# Patient Record
Sex: MALE | Race: WHITE | NOT HISPANIC OR LATINO | Employment: FULL TIME | URBAN - METROPOLITAN AREA
[De-identification: names, ages, dates, MRNs, and addresses within clinical notes are randomized per-mention and may not be internally consistent; named-entity substitution may affect disease eponyms.]

---

## 2018-04-20 ENCOUNTER — HOSPITAL ENCOUNTER (EMERGENCY)
Facility: HOSPITAL | Age: 28
Discharge: HOME | End: 2018-04-20
Attending: EMERGENCY MEDICINE
Payer: COMMERCIAL

## 2018-04-20 VITALS
RESPIRATION RATE: 18 BRPM | SYSTOLIC BLOOD PRESSURE: 135 MMHG | OXYGEN SATURATION: 98 % | TEMPERATURE: 97.7 F | WEIGHT: 205 LBS | DIASTOLIC BLOOD PRESSURE: 79 MMHG | HEART RATE: 68 BPM | BODY MASS INDEX: 32.18 KG/M2 | HEIGHT: 67 IN

## 2018-04-20 DIAGNOSIS — S05.01XA ABRASION OF RIGHT CORNEA, INITIAL ENCOUNTER: Primary | ICD-10-CM

## 2018-04-20 PROCEDURE — 99283 EMERGENCY DEPT VISIT LOW MDM: CPT

## 2018-04-20 RX ORDER — OFLOXACIN 3 MG/ML
1 SOLUTION/ DROPS OPHTHALMIC 4 TIMES DAILY
Qty: 5 ML | Refills: 0 | Status: SHIPPED | OUTPATIENT
Start: 2018-04-20 | End: 2018-04-25

## 2018-04-20 RX ORDER — PROPARACAINE HYDROCHLORIDE 5 MG/ML
1 SOLUTION/ DROPS OPHTHALMIC ONCE
Status: COMPLETED | OUTPATIENT
Start: 2018-04-20 | End: 2018-04-20

## 2018-04-20 RX ADMIN — PROPARACAINE HYDROCHLORIDE 1 DROP: 5 SOLUTION/ DROPS OPHTHALMIC at 08:44

## 2018-04-20 RX ADMIN — FLUORESCEIN SODIUM 1 STRIP: 1 STRIP OPHTHALMIC at 08:44

## 2018-04-20 NOTE — ED PROVIDER NOTES
History  Chief Complaint   Patient presents with    Eye Problem     poss dirt in right eye     56-year-old male patient presenting with a chief complaint of right eye irritation  He believes he got something in his eye last night while working underneath a car  The right eye is having conjunctival injection  There is some mild periorbital edema but nothing that would suggest a cellulitis  Prior to Admission Medications   Prescriptions Last Dose Informant Patient Reported? Taking?   ondansetron (ZOFRAN-ODT) 4 mg disintegrating tablet   No No   Sig: Take 1 tablet by mouth every 8 (eight) hours as needed for nausea or vomiting for up to 3 days      Facility-Administered Medications: None       History reviewed  No pertinent past medical history  History reviewed  No pertinent surgical history  History reviewed  No pertinent family history  I have reviewed and agree with the history as documented  Social History   Substance Use Topics    Smoking status: Never Smoker    Smokeless tobacco: Not on file    Alcohol use Yes      Comment: socially        Review of Systems   Constitutional: Negative for diaphoresis, fatigue and fever  HENT: Negative for congestion, ear pain, nosebleeds and sore throat  Eyes: Positive for redness  Negative for photophobia, pain, discharge and visual disturbance  Respiratory: Negative for cough, choking, chest tightness, shortness of breath and wheezing  Cardiovascular: Negative for chest pain and palpitations  Gastrointestinal: Negative for abdominal distention, abdominal pain, diarrhea and vomiting  Genitourinary: Negative for dysuria, flank pain and frequency  Musculoskeletal: Negative for back pain, gait problem and joint swelling  Skin: Negative for color change and rash  Neurological: Negative for dizziness, syncope and headaches  Psychiatric/Behavioral: Negative for behavioral problems and confusion  The patient is not nervous/anxious  All other systems reviewed and are negative  Physical Exam  ED Triage Vitals [04/20/18 0811]   Temperature Pulse Respirations Blood Pressure SpO2   97 7 °F (36 5 °C) 69 18 135/79 97 %      Temp src Heart Rate Source Patient Position - Orthostatic VS BP Location FiO2 (%)   -- -- -- -- --      Pain Score       No Pain           Orthostatic Vital Signs  Vitals:    04/20/18 0811 04/20/18 0815   BP: 135/79 135/79   Pulse: 69 68       Physical Exam   Constitutional: He is oriented to person, place, and time  He appears well-developed and well-nourished  HENT:   Head: Normocephalic and atraumatic  Eyes: Pupils are equal, round, and reactive to light  Right eye corneal abrasion with mild periorbital edema corneal abrasions lateral at approximately 9 o'clock   Neck: Normal range of motion  Neck supple  Cardiovascular: Normal rate, regular rhythm, normal heart sounds and normal pulses  PMI is not displaced  Pulmonary/Chest: Effort normal and breath sounds normal  No respiratory distress  Abdominal: Soft  He exhibits no distension  There is no guarding  Musculoskeletal: Normal range of motion  Lymphadenopathy:     He has no cervical adenopathy  Neurological: He is alert and oriented to person, place, and time  Skin: Skin is warm and dry  No rash noted  He is not diaphoretic  No pallor  Psychiatric: He has a normal mood and affect  Vitals reviewed        ED Medications  Medications   proparacaine (ALCAINE) 0 5 % ophthalmic solution 1 drop (not administered)   fluorescein sodium sterile ophthalmic strip 1 strip (not administered)       Diagnostic Studies  Results Reviewed     None                 No orders to display              Procedures  Procedures       Phone Contacts  ED Phone Contact    ED Course  ED Course                                MDM  Number of Diagnoses or Management Options  Abrasion of right cornea, initial encounter: new and requires workup  Diagnosis management comments: Mild corneal abrasion  Antibiotic therapy  Follow-up as needed  Amount and/or Complexity of Data Reviewed  Independent visualization of images, tracings, or specimens: yes    Patient Progress  Patient progress: stable    CritCare Time    Disposition  Final diagnoses:   Abrasion of right cornea, initial encounter     Time reflects when diagnosis was documented in both MDM as applicable and the Disposition within this note     Time User Action Codes Description Comment    4/20/2018  8:29 AM Alessandro Julio Add [S05 [id number]] Abrasion of right cornea, initial encounter       ED Disposition     ED Disposition Condition Comment    Discharge  Dk Betancur discharge to home/self care  Condition at discharge: Good        Follow-up Information     Follow up With Specialties Details Why Contact Info    Silas Swanson MD Family Medicine   [address] [place] [address]  [address] [place] [address] E  Schedule an appointment as soon as possible for a visit For Continued Evaluation [address]  [phone]        Patient's Medications   Discharge Prescriptions    OFLOXACIN (OCUFLOX) 0 3 % OPHTHALMIC SOLUTION    Apply 1 drop to eye 4 (four) times a day for 5 days       Start Date: 4/20/2018 End Date: 4/25/2018       Order Dose: 1 drop       Quantity: 5 mL    Refills: 0     No discharge procedures on file      ED Provider  Electronically Signed by           Margarita Montero  04/20/18 2025

## 2018-04-20 NOTE — DISCHARGE INSTRUCTIONS
Corneal Abrasion   WHAT YOU NEED TO KNOW:   A corneal abrasion is a scratch on the cornea of your eye  The cornea is the clear layer that covers the front of your eye  A small scratch may heal in 1 to 2 days  Deeper or larger scratches may take longer to heal         DISCHARGE INSTRUCTIONS:   Contact your healthcare provider if:   · Your eye pain or vision gets worse  · You have yellow or green drainage from your eye  · You have questions or concerns about your condition or care  Medicines:   · Medicines  may be given in the form of eyedrops or ointment to help prevent an eye infection  You may also be given eye drops to decrease pain  Ask how to take this medicine safely  · Take your medicine as directed  Contact your healthcare provider if you think your medicine is not helping or if you have side effects  Tell him or her if you are allergic to any medicine  Keep a list of the medicines, vitamins, and herbs you take  Include the amounts, and when and why you take them  Bring the list or the pill bottles to follow-up visits  Carry your medicine list with you in case of an emergency  Follow up with your healthcare provider as directed:  Write down your questions so you remember to ask them during your visits  Self-care:   · Do not touch or rub your eye  · Ask your healthcare provider when you can start your normal activities  · Ask your healthcare provider when you can wear your contact lenses  · Wear sunglasses in bright light until your eyes feel better  Help prevent corneal abrasions:   · Remove your contact lenses if your eyes feel dry or irritated  · Wash your hands if you need to touch your eyes or your face  · Trim your child's fingernails so he cannot scratch his eye  · Wear protective eyewear when you work with chemicals, wood, dust, or metal      · Wear protective eyewear when you play sports  · Do not wear your contacts for longer than you should       · Do not wear colored lenses or lenses with shapes on them  These lenses may cause eye damage and vision loss  · Do not wear glitter makeup  Glitter can easily get into your eyes and under contact lenses  · Do not sleep with your contacts in your eyes  © 2017 [address] Information is for End User's use only and may not be sold, redistributed or otherwise used for commercial purposes  All illustrations and images included in CareNotes® are the copyrighted property of [employer] , [email or web address]  or [employer] Jackson  The above information is an  only  It is not intended as medical advice for individual conditions or treatments  Talk to your doctor, nurse or pharmacist before following any medical regimen to see if it is safe and effective for you

## 2018-04-23 ENCOUNTER — VBI (OUTPATIENT)
Dept: ADMINISTRATIVE | Facility: OTHER | Age: 28
End: 2018-04-23

## 2018-04-23 NOTE — TELEPHONE ENCOUNTER
Miguellinnette Susis    ED Visit Information     Ed visit date: 4/20/18  Diagnosis Description: Abrasion of right cornea,   initial encounter  In Network? Yes Cindy  Discharge status: Home  Discharged with meds ? NA  Number of ED visits to date: 1  ED Severity:4     Outreach Information    Outreach successful: Yes 1  Date letter mailed:  Date Finalized:4/23/18    Care Coordination    Follow up appointment with pcp: no feeling better  Transportation issues ? NA    [employer] type:  3 Day Outreach  Patient refsued the answer questions:  Yes  [facility] PCP:  Yes  Liliam Hobson stated he is feeling better but unable to answer questions

## 2021-12-03 ENCOUNTER — OFFICE VISIT (OUTPATIENT)
Dept: INTERNAL MEDICINE CLINIC | Facility: CLINIC | Age: 31
End: 2021-12-03
Payer: COMMERCIAL

## 2021-12-03 VITALS
HEART RATE: 71 BPM | WEIGHT: 218.8 LBS | TEMPERATURE: 98.3 F | OXYGEN SATURATION: 98 % | HEIGHT: 68 IN | DIASTOLIC BLOOD PRESSURE: 80 MMHG | BODY MASS INDEX: 33.16 KG/M2 | SYSTOLIC BLOOD PRESSURE: 116 MMHG

## 2021-12-03 DIAGNOSIS — K21.00 GASTROESOPHAGEAL REFLUX DISEASE WITH ESOPHAGITIS WITHOUT HEMORRHAGE: ICD-10-CM

## 2021-12-03 DIAGNOSIS — Z11.59 ENCOUNTER FOR HEPATITIS C SCREENING TEST FOR LOW RISK PATIENT: ICD-10-CM

## 2021-12-03 DIAGNOSIS — Z11.4 ENCOUNTER FOR SCREENING FOR HIV: ICD-10-CM

## 2021-12-03 DIAGNOSIS — R07.89 SENSATION OF CHEST PRESSURE: Primary | ICD-10-CM

## 2021-12-03 DIAGNOSIS — Z13.220 ENCOUNTER FOR LIPID SCREENING FOR CARDIOVASCULAR DISEASE: ICD-10-CM

## 2021-12-03 DIAGNOSIS — R00.2 PALPITATIONS: ICD-10-CM

## 2021-12-03 DIAGNOSIS — R73.01 ABNORMAL FASTING GLUCOSE: ICD-10-CM

## 2021-12-03 DIAGNOSIS — Z13.6 ENCOUNTER FOR LIPID SCREENING FOR CARDIOVASCULAR DISEASE: ICD-10-CM

## 2021-12-03 PROCEDURE — 3008F BODY MASS INDEX DOCD: CPT | Performed by: INTERNAL MEDICINE

## 2021-12-03 PROCEDURE — 93000 ELECTROCARDIOGRAM COMPLETE: CPT | Performed by: INTERNAL MEDICINE

## 2021-12-03 PROCEDURE — 3725F SCREEN DEPRESSION PERFORMED: CPT | Performed by: INTERNAL MEDICINE

## 2021-12-03 PROCEDURE — 99203 OFFICE O/P NEW LOW 30 MIN: CPT | Performed by: INTERNAL MEDICINE

## 2021-12-03 RX ORDER — OMEPRAZOLE 20 MG/1
20 CAPSULE, DELAYED RELEASE ORAL
Qty: 30 CAPSULE | Refills: 5 | Status: SHIPPED | OUTPATIENT
Start: 2021-12-03 | End: 2022-06-28 | Stop reason: SDUPTHER

## 2021-12-03 RX ORDER — ASPIRIN 81 MG/1
81 TABLET, CHEWABLE ORAL DAILY
COMMUNITY
End: 2021-12-03

## 2021-12-03 RX ORDER — IBUPROFEN 800 MG/1
TABLET ORAL
COMMUNITY
End: 2021-12-03

## 2021-12-04 ENCOUNTER — APPOINTMENT (OUTPATIENT)
Dept: LAB | Facility: HOSPITAL | Age: 31
End: 2021-12-04
Payer: COMMERCIAL

## 2021-12-04 DIAGNOSIS — R00.2 PALPITATIONS: ICD-10-CM

## 2021-12-04 DIAGNOSIS — Z13.220 ENCOUNTER FOR LIPID SCREENING FOR CARDIOVASCULAR DISEASE: ICD-10-CM

## 2021-12-04 DIAGNOSIS — Z13.6 ENCOUNTER FOR LIPID SCREENING FOR CARDIOVASCULAR DISEASE: ICD-10-CM

## 2021-12-04 DIAGNOSIS — Z11.59 ENCOUNTER FOR HEPATITIS C SCREENING TEST FOR LOW RISK PATIENT: ICD-10-CM

## 2021-12-04 DIAGNOSIS — R07.89 SENSATION OF CHEST PRESSURE: ICD-10-CM

## 2021-12-04 DIAGNOSIS — R73.01 ABNORMAL FASTING GLUCOSE: ICD-10-CM

## 2021-12-04 DIAGNOSIS — Z11.4 ENCOUNTER FOR SCREENING FOR HIV: ICD-10-CM

## 2021-12-04 LAB
ALBUMIN SERPL BCP-MCNC: 4 G/DL (ref 3.5–5)
ALP SERPL-CCNC: 67 U/L (ref 46–116)
ALT SERPL W P-5'-P-CCNC: 38 U/L (ref 12–78)
ANION GAP SERPL CALCULATED.3IONS-SCNC: 6 MMOL/L (ref 4–13)
AST SERPL W P-5'-P-CCNC: 20 U/L (ref 5–45)
BASOPHILS # BLD AUTO: 0.04 THOUSANDS/ΜL (ref 0–0.1)
BASOPHILS NFR BLD AUTO: 1 % (ref 0–1)
BILIRUB SERPL-MCNC: 0.48 MG/DL (ref 0.2–1)
BUN SERPL-MCNC: 14 MG/DL (ref 5–25)
CALCIUM SERPL-MCNC: 9.7 MG/DL (ref 8.3–10.1)
CHLORIDE SERPL-SCNC: 107 MMOL/L (ref 100–108)
CHOLEST SERPL-MCNC: 192 MG/DL
CO2 SERPL-SCNC: 27 MMOL/L (ref 21–32)
CREAT SERPL-MCNC: 1.02 MG/DL (ref 0.6–1.3)
EOSINOPHIL # BLD AUTO: 0.08 THOUSAND/ΜL (ref 0–0.61)
EOSINOPHIL NFR BLD AUTO: 2 % (ref 0–6)
ERYTHROCYTE [DISTWIDTH] IN BLOOD BY AUTOMATED COUNT: 12.6 % (ref 11.6–15.1)
GFR SERPL CREATININE-BSD FRML MDRD: 98 ML/MIN/1.73SQ M
GLUCOSE P FAST SERPL-MCNC: 92 MG/DL (ref 65–99)
HCT VFR BLD AUTO: 47.7 % (ref 36.5–49.3)
HDLC SERPL-MCNC: 35 MG/DL
HGB BLD-MCNC: 16.1 G/DL (ref 12–17)
IMM GRANULOCYTES # BLD AUTO: 0.01 THOUSAND/UL (ref 0–0.2)
IMM GRANULOCYTES NFR BLD AUTO: 0 % (ref 0–2)
LDLC SERPL CALC-MCNC: 142 MG/DL (ref 0–100)
LYMPHOCYTES # BLD AUTO: 1.35 THOUSANDS/ΜL (ref 0.6–4.47)
LYMPHOCYTES NFR BLD AUTO: 31 % (ref 14–44)
MCH RBC QN AUTO: 28.4 PG (ref 26.8–34.3)
MCHC RBC AUTO-ENTMCNC: 33.8 G/DL (ref 31.4–37.4)
MCV RBC AUTO: 84 FL (ref 82–98)
MONOCYTES # BLD AUTO: 0.39 THOUSAND/ΜL (ref 0.17–1.22)
MONOCYTES NFR BLD AUTO: 9 % (ref 4–12)
NEUTROPHILS # BLD AUTO: 2.47 THOUSANDS/ΜL (ref 1.85–7.62)
NEUTS SEG NFR BLD AUTO: 57 % (ref 43–75)
NRBC BLD AUTO-RTO: 0 /100 WBCS
PLATELET # BLD AUTO: 224 THOUSANDS/UL (ref 149–390)
PMV BLD AUTO: 10.2 FL (ref 8.9–12.7)
POTASSIUM SERPL-SCNC: 4.2 MMOL/L (ref 3.5–5.3)
PROT SERPL-MCNC: 7.3 G/DL (ref 6.4–8.2)
RBC # BLD AUTO: 5.67 MILLION/UL (ref 3.88–5.62)
SODIUM SERPL-SCNC: 140 MMOL/L (ref 136–145)
TRIGL SERPL-MCNC: 77 MG/DL
TSH SERPL DL<=0.05 MIU/L-ACNC: 0.72 UIU/ML (ref 0.36–3.74)
WBC # BLD AUTO: 4.34 THOUSAND/UL (ref 4.31–10.16)

## 2021-12-04 PROCEDURE — 84443 ASSAY THYROID STIM HORMONE: CPT

## 2021-12-04 PROCEDURE — 86803 HEPATITIS C AB TEST: CPT

## 2021-12-04 PROCEDURE — 85025 COMPLETE CBC W/AUTO DIFF WBC: CPT

## 2021-12-04 PROCEDURE — 80061 LIPID PANEL: CPT

## 2021-12-04 PROCEDURE — 80053 COMPREHEN METABOLIC PANEL: CPT

## 2021-12-04 PROCEDURE — 87389 HIV-1 AG W/HIV-1&-2 AB AG IA: CPT

## 2021-12-04 PROCEDURE — 83036 HEMOGLOBIN GLYCOSYLATED A1C: CPT

## 2021-12-04 PROCEDURE — 36415 COLL VENOUS BLD VENIPUNCTURE: CPT

## 2021-12-05 LAB
EST. AVERAGE GLUCOSE BLD GHB EST-MCNC: 103 MG/DL
HBA1C MFR BLD: 5.2 %
HCV AB SER QL: NORMAL

## 2021-12-06 LAB — HIV 1+2 AB+HIV1 P24 AG SERPL QL IA: NORMAL

## 2021-12-17 PROBLEM — K21.00 GASTROESOPHAGEAL REFLUX DISEASE WITH ESOPHAGITIS WITHOUT HEMORRHAGE: Status: ACTIVE | Noted: 2021-12-17

## 2021-12-17 PROBLEM — R07.89 SENSATION OF CHEST PRESSURE: Status: ACTIVE | Noted: 2021-12-17

## 2021-12-17 PROBLEM — R00.2 PALPITATIONS: Status: ACTIVE | Noted: 2021-12-17

## 2021-12-23 ENCOUNTER — TELEMEDICINE (OUTPATIENT)
Dept: INTERNAL MEDICINE CLINIC | Facility: CLINIC | Age: 31
End: 2021-12-23
Payer: COMMERCIAL

## 2021-12-23 DIAGNOSIS — J02.9 SORE THROAT: Primary | ICD-10-CM

## 2021-12-23 PROCEDURE — U0003 INFECTIOUS AGENT DETECTION BY NUCLEIC ACID (DNA OR RNA); SEVERE ACUTE RESPIRATORY SYNDROME CORONAVIRUS 2 (SARS-COV-2) (CORONAVIRUS DISEASE [COVID-19]), AMPLIFIED PROBE TECHNIQUE, MAKING USE OF HIGH THROUGHPUT TECHNOLOGIES AS DESCRIBED BY CMS-2020-01-R: HCPCS | Performed by: INTERNAL MEDICINE

## 2021-12-23 PROCEDURE — U0005 INFEC AGEN DETEC AMPLI PROBE: HCPCS | Performed by: INTERNAL MEDICINE

## 2021-12-23 PROCEDURE — 99214 OFFICE O/P EST MOD 30 MIN: CPT | Performed by: INTERNAL MEDICINE

## 2021-12-23 PROCEDURE — 1036F TOBACCO NON-USER: CPT | Performed by: INTERNAL MEDICINE

## 2021-12-24 LAB — SARS-COV-2 RNA RESP QL NAA+PROBE: NEGATIVE

## 2022-01-01 ENCOUNTER — DOCUMENTATION (OUTPATIENT)
Dept: INTERNAL MEDICINE CLINIC | Facility: CLINIC | Age: 32
End: 2022-01-01

## 2022-01-01 DIAGNOSIS — J40 BRONCHITIS: Primary | ICD-10-CM

## 2022-01-01 RX ORDER — AZITHROMYCIN 250 MG/1
TABLET, FILM COATED ORAL
Qty: 6 TABLET | Refills: 0 | Status: SHIPPED | OUTPATIENT
Start: 2022-01-01 | End: 2022-01-05

## 2022-01-26 ENCOUNTER — TELEPHONE (OUTPATIENT)
Dept: INTERNAL MEDICINE CLINIC | Facility: CLINIC | Age: 32
End: 2022-01-26

## 2022-03-04 ENCOUNTER — OFFICE VISIT (OUTPATIENT)
Dept: INTERNAL MEDICINE CLINIC | Facility: CLINIC | Age: 32
End: 2022-03-04
Payer: COMMERCIAL

## 2022-03-04 VITALS
HEIGHT: 68 IN | WEIGHT: 229 LBS | DIASTOLIC BLOOD PRESSURE: 86 MMHG | OXYGEN SATURATION: 98 % | TEMPERATURE: 98.9 F | HEART RATE: 70 BPM | BODY MASS INDEX: 34.71 KG/M2 | SYSTOLIC BLOOD PRESSURE: 118 MMHG

## 2022-03-04 DIAGNOSIS — R73.01 ABNORMAL FASTING GLUCOSE: ICD-10-CM

## 2022-03-04 DIAGNOSIS — Z13.220 ENCOUNTER FOR LIPID SCREENING FOR CARDIOVASCULAR DISEASE: ICD-10-CM

## 2022-03-04 DIAGNOSIS — K21.00 GASTROESOPHAGEAL REFLUX DISEASE WITH ESOPHAGITIS WITHOUT HEMORRHAGE: Primary | ICD-10-CM

## 2022-03-04 DIAGNOSIS — Z13.6 ENCOUNTER FOR LIPID SCREENING FOR CARDIOVASCULAR DISEASE: ICD-10-CM

## 2022-03-04 PROCEDURE — 99214 OFFICE O/P EST MOD 30 MIN: CPT | Performed by: INTERNAL MEDICINE

## 2022-03-04 NOTE — PROGRESS NOTES
Assessment/Plan:      Quality Measures:     BMI Counseling: Body mass index is 34 82 kg/m²  The BMI is above normal  Nutrition recommendations include decreasing portion sizes and encouraging healthy choices of fruits and vegetables  Exercise recommendations include moderate physical activity 150 minutes/week  Rationale for BMI follow-up plan is due to patient being overweight or obese  Return in about 6 months (around 9/4/2022)  No problem-specific Assessment & Plan notes found for this encounter  Diagnoses and all orders for this visit:    Gastroesophageal reflux disease with esophagitis without hemorrhage  -     CBC and differential; Future  -     Comprehensive metabolic panel; Future    Abnormal fasting glucose  -     CBC and differential; Future  -     Comprehensive metabolic panel; Future  -     Hemoglobin A1C; Future    Encounter for lipid screening for cardiovascular disease  -     Lipid Panel with Direct LDL reflex; Future          Subjective:      Patient ID: Walter Orellana is a 33 y o  male  Jocelyne Pulido is doing well  When I last saw him he was having chest pressure and we discovered it was all r/t GERD  He has been on Prilosec and his symptoms are much improved  He has cut out tomato sauce products  He does state he notices palpitations from time to time at night  May need a holter monitor  We discussed his labs  Discussed diet  Smoking cessation was encouraged  ALLERGIES:  No Known Allergies    CURRENT MEDICATIONS:    Current Outpatient Medications:     omeprazole (PriLOSEC) 20 mg delayed release capsule, Take 1 capsule (20 mg total) by mouth daily before breakfast, Disp: 30 capsule, Rfl: 5    ACTIVE PROBLEM LIST:  Patient Active Problem List   Diagnosis    Sensation of chest pressure    Palpitations    Gastroesophageal reflux disease with esophagitis without hemorrhage       PAST MEDICAL HISTORY:  History reviewed  No pertinent past medical history      PAST SURGICAL HISTORY:  History reviewed  No pertinent surgical history  FAMILY HISTORY:  History reviewed  No pertinent family history  SOCIAL HISTORY:  Social History     Socioeconomic History    Marital status: /Civil Union     Spouse name: Not on file    Number of children: Not on file    Years of education: Not on file    Highest education level: Not on file   Occupational History    Not on file   Tobacco Use    Smoking status: Current Every Day Smoker    Smokeless tobacco: Current User     Types: Chew   Vaping Use    Vaping Use: Former   Substance and Sexual Activity    Alcohol use: Yes     Comment: socially    Drug use: Yes     Types: Marijuana     Comment: once a week   Sexual activity: Not on file   Other Topics Concern    Not on file   Social History Narrative    Not on file     Social Determinants of Health     Financial Resource Strain: Not on file   Food Insecurity: Not on file   Transportation Needs: Not on file   Physical Activity: Not on file   Stress: Not on file   Social Connections: Not on file   Intimate Partner Violence: Not on file   Housing Stability: Not on file       Review of Systems   Constitutional: Negative for diaphoresis, fatigue and unexpected weight change  HENT: Negative for sinus pain  Respiratory: Negative for chest tightness, shortness of breath and wheezing  Cardiovascular: Negative for chest pain  Genitourinary: Negative for decreased urine volume, difficulty urinating and dysuria  Musculoskeletal: Negative for arthralgias and back pain  Neurological: Negative for weakness and light-headedness  Psychiatric/Behavioral: Negative for decreased concentration and dysphoric mood  All other systems reviewed and are negative          Objective:  Vitals:    03/04/22 1622   BP: 118/86   BP Location: Left arm   Patient Position: Sitting   Cuff Size: Large   Pulse: 70   Temp: 98 9 °F (37 2 °C)   TempSrc: Tympanic   SpO2: 98%   Weight: 104 kg (229 lb) Height: 5' 8" (1 727 m)     Body mass index is 34 82 kg/m²  Physical Exam  Vitals and nursing note reviewed  Constitutional:       Appearance: Normal appearance  HENT:      Head: Normocephalic and atraumatic  Right Ear: Tympanic membrane normal       Left Ear: Tympanic membrane normal       Nose: Nose normal       Mouth/Throat:      Mouth: Mucous membranes are moist    Cardiovascular:      Rate and Rhythm: Normal rate and regular rhythm  Heart sounds: Normal heart sounds  Pulmonary:      Effort: Pulmonary effort is normal       Breath sounds: Normal breath sounds  Abdominal:      Palpations: Abdomen is soft  Musculoskeletal:         General: Normal range of motion  Cervical back: Normal range of motion  Right lower leg: No edema  Left lower leg: No edema  Skin:     General: Skin is warm and dry  Neurological:      General: No focal deficit present  Mental Status: He is alert and oriented to person, place, and time  Mental status is at baseline  Psychiatric:         Mood and Affect: Mood normal            RESULTS:    No results found for this or any previous visit (from the past 1008 hour(s))  This note was created with voice recognition software  Phonic, grammatical and spelling errors may be present within the note as a result

## 2022-03-14 ENCOUNTER — OFFICE VISIT (OUTPATIENT)
Dept: INTERNAL MEDICINE CLINIC | Facility: CLINIC | Age: 32
End: 2022-03-14
Payer: COMMERCIAL

## 2022-03-14 ENCOUNTER — HOSPITAL ENCOUNTER (OUTPATIENT)
Dept: RADIOLOGY | Facility: HOSPITAL | Age: 32
Discharge: HOME | End: 2022-03-14
Payer: COMMERCIAL

## 2022-03-14 VITALS
SYSTOLIC BLOOD PRESSURE: 130 MMHG | HEIGHT: 68 IN | DIASTOLIC BLOOD PRESSURE: 88 MMHG | WEIGHT: 229 LBS | TEMPERATURE: 98.9 F | HEART RATE: 75 BPM | BODY MASS INDEX: 34.71 KG/M2 | OXYGEN SATURATION: 97 %

## 2022-03-14 DIAGNOSIS — K21.00 GASTROESOPHAGEAL REFLUX DISEASE WITH ESOPHAGITIS WITHOUT HEMORRHAGE: ICD-10-CM

## 2022-03-14 DIAGNOSIS — R06.02 SOB (SHORTNESS OF BREATH): ICD-10-CM

## 2022-03-14 DIAGNOSIS — R07.89 SENSATION OF CHEST PRESSURE: ICD-10-CM

## 2022-03-14 DIAGNOSIS — R00.2 PALPITATIONS: Primary | ICD-10-CM

## 2022-03-14 PROCEDURE — 4004F PT TOBACCO SCREEN RCVD TLK: CPT | Performed by: INTERNAL MEDICINE

## 2022-03-14 PROCEDURE — 93000 ELECTROCARDIOGRAM COMPLETE: CPT | Performed by: INTERNAL MEDICINE

## 2022-03-14 PROCEDURE — 3008F BODY MASS INDEX DOCD: CPT | Performed by: INTERNAL MEDICINE

## 2022-03-14 PROCEDURE — 71046 X-RAY EXAM CHEST 2 VIEWS: CPT

## 2022-03-14 PROCEDURE — 99214 OFFICE O/P EST MOD 30 MIN: CPT | Performed by: INTERNAL MEDICINE

## 2022-03-14 NOTE — PROGRESS NOTES
Assessment/Plan:      Quality Measures: Tobacco Cessation Counseling: Tobacco cessation counseling was provided  The patient is sincerely urged to quit consumption of tobacco  He is not ready to quit tobacco         Return in about 2 weeks (around 3/28/2022)  No problem-specific Assessment & Plan notes found for this encounter  Diagnoses and all orders for this visit:    Palpitations  -     POCT ECG  -     Echo complete w/ contrast if indicated; Future  -     Holter monitor; Future    Gastroesophageal reflux disease with esophagitis without hemorrhage    Sensation of chest pressure  -     XR chest pa & lateral; Future    SOB (shortness of breath)  -     Echo complete w/ contrast if indicated; Future  -     XR chest pa & lateral; Future          Subjective:      Patient ID: Joe Villarreal is a 33 y o  male  Tiney Bearded is here with c/o left sided chest pain that started yesterday morning  Feels like a crushing type of pain  Previously in his normal state of health but I have seen him for simialr complaints and it was all thought to be r/t his GERD  EKG done at the time was normal  Associated with palpitations and SOB  Yesterday he went to an event and admits to drinking about 3-4 beers and drinking multiple shots of liquor  He did not take his Prilosec and did not take it this morning bc he did not want to mask his symptoms  He is anxious  I suspect this is r/t his GERD and anxiety  EKG today normal      Will obtain echo to r/o structural heart dx, Holter monitor, and obtain CXR        ALLERGIES:  No Known Allergies    CURRENT MEDICATIONS:    Current Outpatient Medications:     omeprazole (PriLOSEC) 20 mg delayed release capsule, Take 1 capsule (20 mg total) by mouth daily before breakfast, Disp: 30 capsule, Rfl: 5    ACTIVE PROBLEM LIST:  Patient Active Problem List   Diagnosis    Sensation of chest pressure    Palpitations    Gastroesophageal reflux disease with esophagitis without hemorrhage PAST MEDICAL HISTORY:  History reviewed  No pertinent past medical history  PAST SURGICAL HISTORY:  History reviewed  No pertinent surgical history  FAMILY HISTORY:  History reviewed  No pertinent family history  SOCIAL HISTORY:  Social History     Socioeconomic History    Marital status: /Civil Union     Spouse name: Not on file    Number of children: Not on file    Years of education: Not on file    Highest education level: Not on file   Occupational History    Not on file   Tobacco Use    Smoking status: Current Every Day Smoker    Smokeless tobacco: Current User     Types: Chew   Vaping Use    Vaping Use: Former   Substance and Sexual Activity    Alcohol use: Yes     Comment: socially    Drug use: Yes     Types: Marijuana     Comment: once a week   Sexual activity: Not on file   Other Topics Concern    Not on file   Social History Narrative    Not on file     Social Determinants of Health     Financial Resource Strain: Not on file   Food Insecurity: Not on file   Transportation Needs: Not on file   Physical Activity: Not on file   Stress: Not on file   Social Connections: Not on file   Intimate Partner Violence: Not on file   Housing Stability: Not on file       Review of Systems   Respiratory: Positive for shortness of breath  Cardiovascular: Positive for chest pain and palpitations  Psychiatric/Behavioral: The patient is nervous/anxious  All other systems reviewed and are negative  Objective:  Vitals:    03/14/22 1146   BP: 130/88   BP Location: Left arm   Patient Position: Sitting   Cuff Size: Large   Pulse: 75   Temp: 98 9 °F (37 2 °C)   TempSrc: Tympanic   SpO2: 97%   Weight: 104 kg (229 lb)   Height: 5' 8" (1 727 m)     Body mass index is 34 82 kg/m²  Physical Exam  Vitals and nursing note reviewed  Constitutional:       Appearance: Normal appearance  HENT:      Head: Normocephalic and atraumatic        Nose: Nose normal       Mouth/Throat: Mouth: Mucous membranes are moist       Pharynx: Oropharynx is clear  Cardiovascular:      Rate and Rhythm: Normal rate  Heart sounds: Normal heart sounds  Pulmonary:      Effort: Pulmonary effort is normal       Breath sounds: Normal breath sounds  Abdominal:      Palpations: Abdomen is soft  Musculoskeletal:         General: Normal range of motion  Right lower leg: No edema  Left lower leg: No edema  Neurological:      General: No focal deficit present  Mental Status: He is alert and oriented to person, place, and time  Psychiatric:         Mood and Affect: Mood is anxious  Thought Content: Thought content normal            RESULTS:    No results found for this or any previous visit (from the past 1008 hour(s))  This note was created with voice recognition software  Phonic, grammatical and spelling errors may be present within the note as a result

## 2022-03-14 NOTE — LETTER
March 14, 2022     Patient: Ricarda Reynoso   YOB: 1990   Date of Visit: 3/14/2022       To Whom it May Concern:    Ricarda Reynoso is under my professional care  He was seen in my office on 3/14/2022  He may return to work on 3/15/22  If you have any questions or concerns, please don't hesitate to call           Sincerely,          Neil Cardoza MD        CC: No Recipients

## 2022-04-04 ENCOUNTER — HOSPITAL ENCOUNTER (OUTPATIENT)
Dept: NON INVASIVE DIAGNOSTICS | Facility: CLINIC | Age: 32
Discharge: HOME | End: 2022-04-04
Payer: COMMERCIAL

## 2022-04-04 VITALS
BODY MASS INDEX: 34.71 KG/M2 | SYSTOLIC BLOOD PRESSURE: 130 MMHG | WEIGHT: 229 LBS | HEIGHT: 68 IN | DIASTOLIC BLOOD PRESSURE: 88 MMHG | HEART RATE: 66 BPM

## 2022-04-04 DIAGNOSIS — R00.2 PALPITATIONS: ICD-10-CM

## 2022-04-04 DIAGNOSIS — R06.02 SOB (SHORTNESS OF BREATH): ICD-10-CM

## 2022-04-04 LAB
AORTIC ROOT: 3.5 CM
APICAL FOUR CHAMBER EJECTION FRACTION: 69 %
AV LVOT PEAK GRADIENT: 5 MMHG
AV PEAK GRADIENT: 6 MMHG
E WAVE DECELERATION TIME: 222 MS
FRACTIONAL SHORTENING: 31 % (ref 28–44)
INTERVENTRICULAR SEPTUM IN DIASTOLE (PARASTERNAL SHORT AXIS VIEW): 0.8 CM
INTERVENTRICULAR SEPTUM: 0.8 CM (ref 0.56–1.05)
LAAS-AP2: 13.7 CM2
LAAS-AP4: 15.5 CM2
LEFT ATRIUM AREA SYSTOLE SINGLE PLANE A4C: 15.3 CM2
LEFT ATRIUM SIZE: 3.6 CM
LEFT INTERNAL DIMENSION IN SYSTOLE: 3.4 CM (ref 4.15–6.28)
LEFT VENTRICULAR INTERNAL DIMENSION IN DIASTOLE: 4.9 CM (ref 6.91–10.3)
LEFT VENTRICULAR POSTERIOR WALL IN END DIASTOLE: 0.9 CM (ref 0.55–1.04)
LEFT VENTRICULAR STROKE VOLUME: 69 ML
LVSV (TEICH): 69 ML
MV E'TISSUE VEL-LAT: 14 CM/S
MV PEAK A VEL: 0.32 M/S
MV PEAK E VEL: 94 CM/S
MV STENOSIS PRESSURE HALF TIME: 64 MS
MV VALVE AREA P 1/2 METHOD: 3.44 CM2
RIGHT ATRIUM AREA SYSTOLE A4C: 11.9 CM2
RIGHT VENTRICLE ID DIMENSION: 3.4 CM
SL CV LEFT ATRIUM LENGTH A2C: 5 CM
SL CV LV EF: 69
SL CV PED ECHO LEFT VENTRICLE DIASTOLIC VOLUME (MOD BIPLANE) 2D: 115 ML
SL CV PED ECHO LEFT VENTRICLE SYSTOLIC VOLUME (MOD BIPLANE) 2D: 46 ML
TR MAX PG: 21 MMHG
TR PEAK VELOCITY: 2.3 M/S
TRICUSPID VALVE PEAK REGURGITATION VELOCITY: 2.32 M/S
Z-SCORE OF INTERVENTRICULAR SEPTUM IN END DIASTOLE: -0.04
Z-SCORE OF LEFT VENTRICULAR DIMENSION IN END DIASTOLE: -5.43
Z-SCORE OF LEFT VENTRICULAR DIMENSION IN END SYSTOLE: -3.21
Z-SCORE OF LEFT VENTRICULAR POSTERIOR WALL IN END DIASTOLE: 0.87

## 2022-04-04 PROCEDURE — 93306 TTE W/DOPPLER COMPLETE: CPT | Performed by: INTERNAL MEDICINE

## 2022-04-04 PROCEDURE — 93225 XTRNL ECG REC<48 HRS REC: CPT

## 2022-04-04 PROCEDURE — 93226 XTRNL ECG REC<48 HR SCAN A/R: CPT

## 2022-04-04 PROCEDURE — 93306 TTE W/DOPPLER COMPLETE: CPT

## 2022-04-07 ENCOUNTER — TELEPHONE (OUTPATIENT)
Dept: INTERNAL MEDICINE CLINIC | Facility: CLINIC | Age: 32
End: 2022-04-07

## 2022-04-07 NOTE — TELEPHONE ENCOUNTER
PED[place]: Patient spoke with his mother and what he has been going through could be hereditary  His mother has the same symptoms (acid reflux) and she is on this medication: Pantoprazole sodium 40 mg tablet    Patient said that what you put him on does help somewhat but also wanted you to be aware of what his mother has been taking for the last 20 years +      Patient can be reached at

## 2022-04-08 PROCEDURE — 93227 XTRNL ECG REC<48 HR R&I: CPT | Performed by: INTERNAL MEDICINE

## 2022-06-28 DIAGNOSIS — K21.00 GASTROESOPHAGEAL REFLUX DISEASE WITH ESOPHAGITIS WITHOUT HEMORRHAGE: ICD-10-CM

## 2022-06-28 RX ORDER — OMEPRAZOLE 20 MG/1
20 CAPSULE, DELAYED RELEASE ORAL
Qty: 30 CAPSULE | Refills: 5 | Status: SHIPPED | OUTPATIENT
Start: 2022-06-28

## 2022-06-28 NOTE — TELEPHONE ENCOUNTER
----- Message from Renae Allison sent at 6/27/2022  7:20 PM EDT -----  Regarding: Prescription Refill Request  Hi there,       Can you please authorize a refill for my Omeprazole and send it to the [facility] on [address] in [place]? Im out of refills on my current script       Thank you,       Sushil Mae

## 2022-07-10 ENCOUNTER — HOSPITAL ENCOUNTER (EMERGENCY)
Facility: HOSPITAL | Age: 32
Discharge: HOME | End: 2022-07-10
Attending: EMERGENCY MEDICINE | Admitting: EMERGENCY MEDICINE
Payer: COMMERCIAL

## 2022-07-10 VITALS
SYSTOLIC BLOOD PRESSURE: 128 MMHG | HEART RATE: 61 BPM | DIASTOLIC BLOOD PRESSURE: 83 MMHG | TEMPERATURE: 97 F | OXYGEN SATURATION: 98 % | RESPIRATION RATE: 18 BRPM

## 2022-07-10 DIAGNOSIS — T63.441A LOCAL REACTION TO BEE STING: Primary | ICD-10-CM

## 2022-07-10 PROCEDURE — 99284 EMERGENCY DEPT VISIT MOD MDM: CPT | Performed by: PHYSICIAN ASSISTANT

## 2022-07-10 PROCEDURE — 96372 THER/PROPH/DIAG INJ SC/IM: CPT

## 2022-07-10 PROCEDURE — 99282 EMERGENCY DEPT VISIT SF MDM: CPT

## 2022-07-10 RX ORDER — DEXAMETHASONE SODIUM PHOSPHATE 10 MG/ML
10 INJECTION, SOLUTION INTRAMUSCULAR; INTRAVENOUS ONCE
Status: COMPLETED | OUTPATIENT
Start: 2022-07-10 | End: 2022-07-10

## 2022-07-10 RX ADMIN — DEXAMETHASONE SODIUM PHOSPHATE 10 MG: 10 INJECTION, SOLUTION INTRAMUSCULAR; INTRAVENOUS at 19:54

## 2022-07-10 NOTE — DISCHARGE INSTRUCTIONS
Apply ice to affected area  Take Benadryl 25mg every 6 hours as needed for itching  Start the Medrol dose pack tomorrow  Please follow-up with your family doctor  Return to the ER with any worsening symptoms

## 2022-07-10 NOTE — ED PROVIDER NOTES
History  Chief Complaint   Patient presents with    Bee Sting     Pt reports being stung multiple times on hand, believes he is allergic, denies any difficulty breathing at this time  Took benadryl 50mg around noon     26yo male with no significant past medical history presenting after bee stings a few hours ago  Patient was doing yard work and was stung 3 times in the left hand and arm  He reports localized swelling and burning  He took 50mg of Benadryl and applied ice to the affected area  He feels that the swelling is worsening so decided to come to the ED  No shortness of breath, wheezing, dysphagia, vomiting, diarrhea  He had a prior bee sting to the face which resulted in localized swelling and received an IM steroid injection at that time  He is requesting the same today  History provided by:  Patient   used: No    Insect Bite  Contact animal:  Insect  Location:  Shoulder/arm  Shoulder/arm injury location:  L arm and L hand  Pain details:     Quality:  Localized and burning    Severity:  Moderate    Timing:  Constant    Progression:  Worsening  Incident location:  Outside  Relieved by:  Nothing  Worsened by:  Nothing  Ineffective treatments:  Cold compresses and OTC medications  Associated symptoms: rash and swelling    Associated symptoms: no fever and no numbness        Prior to Admission Medications   Prescriptions Last Dose Informant Patient Reported? Taking?   omeprazole (PriLOSEC) 20 mg delayed release capsule   No No   Sig: Take 1 capsule (20 mg total) by mouth daily before breakfast      Facility-Administered Medications: None       History reviewed  No pertinent past medical history  History reviewed  No pertinent surgical history  History reviewed  No pertinent family history  I have reviewed and agree with the history as documented      E-Cigarette/Vaping    E-Cigarette Use Former User      E-Cigarette/Vaping Substances     Social History     Tobacco Use    Smoking status: Current Every Day Smoker    Smokeless tobacco: Current User     Types: Chew   Vaping Use    Vaping Use: Former   Substance Use Topics    Alcohol use: Yes     Comment: socially    Drug use: Yes     Types: Marijuana     Comment: once a week  Review of Systems   Constitutional: Negative for chills and fever  HENT: Negative for drooling, trouble swallowing and voice change  Eyes: Negative for discharge and redness  Respiratory: Negative for shortness of breath, wheezing and stridor  Cardiovascular: Negative for chest pain  Gastrointestinal: Negative for diarrhea and vomiting  Skin: Positive for color change and rash  Neurological: Negative for weakness and numbness  Psychiatric/Behavioral: Negative for confusion  The patient is not nervous/anxious  All other systems reviewed and are negative  Physical Exam  Physical Exam  Vitals and nursing note reviewed  Constitutional:       General: He is not in acute distress  Appearance: Normal appearance  He is not ill-appearing, toxic-appearing or diaphoretic  Comments: Non-toxic   HENT:      Head: Normocephalic and atraumatic  Right Ear: External ear normal       Left Ear: External ear normal       Mouth/Throat:      Mouth: Mucous membranes are moist       Pharynx: Oropharynx is clear  No oropharyngeal exudate or posterior oropharyngeal erythema  Comments: No oropharyngeal swelling  Handling oral secretions without difficulty  Eyes:      General: No scleral icterus  Right eye: No discharge  Left eye: No discharge  Conjunctiva/sclera: Conjunctivae normal    Cardiovascular:      Rate and Rhythm: Normal rate and regular rhythm  Heart sounds: No murmur heard  Pulmonary:      Effort: Pulmonary effort is normal  No respiratory distress  Breath sounds: Normal breath sounds  No stridor  No wheezing or rales  Musculoskeletal:         General: No deformity  Normal range of motion  Cervical back: Normal range of motion  Comments: Mild erythema and swelling to the dorsum of the hand and forearm consistent with a localized reaction    Skin:     General: Skin is warm and dry  Neurological:      General: No focal deficit present  Mental Status: He is alert  Mental status is at baseline  Psychiatric:         Mood and Affect: Mood is anxious  Behavior: Behavior normal          Vital Signs  ED Triage Vitals [07/10/22 1841]   Temperature Pulse Respirations Blood Pressure SpO2   (!) 97 °F (36 1 °C) 61 18 128/83 98 %      Temp Source Heart Rate Source Patient Position - Orthostatic VS BP Location FiO2 (%)   Tympanic Monitor Sitting Right arm --      Pain Score       --           Vitals:    07/10/22 1841   BP: 128/83   Pulse: 61   Patient Position - Orthostatic VS: Sitting         Visual Acuity      ED Medications  Medications   dexamethasone (PF) (DECADRON) injection 10 mg (10 mg Intramuscular Given 7/10/22 1954)       Diagnostic Studies  Results Reviewed     None                 No orders to display              Procedures  Procedures         ED Course                     MDM  Number of Diagnoses or Management Options  Local reaction to bee sting: new and does not require workup  Diagnosis management comments: 28yo male presenting after several bee stings to the left arm several hours ago  He feels his swelling is worsening so came to the ED  No respiratory or GI symptoms  Vitals are stable  On exam, there is mild erythema and swelling to the dorsal hand and forearm consistent with a localized reaction  No signs of anaphylaxis  Patient requesting IM steroid injection and IM Decadron was ordered  He has a Medrol dose pack at home that he will start tomorrow  Advised ice and PRN Benadryl  Advised close PCP follow-up  ED return precautions discussed  Patient expressed understanding and is agreeable to plan  Patient discharged in stable condition        Risk of Complications, Morbidity, and/or Mortality  Presenting problems: low  Diagnostic procedures: low  Management options: low    Patient Progress  Patient progress: stable      Disposition  Final diagnoses:   Local reaction to bee sting     Time reflects when diagnosis was documented in both MDM as applicable and the Disposition within this note     Time User Action Codes Description Comment    7/10/2022  7:32  [facility] [address] [place] [[id number]] Local reaction to bee sting       ED Disposition     ED Disposition   Discharge    Condition   Stable    Date/Time   Sun Jul 10, 2022  7:32 PM    Comment   Jannetteruddy Carmel discharge to home/self care  Follow-up Information     Follow up With Specialties Details Why Contact Info Additional Information    Yanely Leggett MD Internal Medicine Schedule an appointment as soon as possible for a visit   [phone]  [facility] 2  [facility] [phone]       [address] [facility] Emergency Department Emergency Medicine  If symptoms worsen [address] Emergency Department, [address] [place] [address], [id number]          Discharge Medication List as of 7/10/2022  7:33 PM      CONTINUE these medications which have NOT CHANGED    Details   omeprazole (PriLOSEC) 20 mg delayed release capsule Take 1 capsule (20 mg total) by mouth daily before breakfast, Starting Tue 6/28/2022, Normal             No discharge procedures on file      PDMP Review     None          ED Provider  Electronically Signed by           Stevenson Guillen PA-C  07/11/22 0993

## 2022-07-10 NOTE — Clinical Note
Vamsi Candelaria was seen and treated in our emergency department on 7/10/2022  No restrictions        none    Diagnosis:     Kristi Stockton  may return to work on return date  He may return on this date: 07/12/2022         If you have any questions or concerns, please don't hesitate to call        rFankie Valle PA-C    ______________________________           _______________          _______________  Hospital Representative                              Date                                Time

## 2022-09-08 ENCOUNTER — TELEPHONE (OUTPATIENT)
Dept: OTHER | Facility: OTHER | Age: 32
End: 2022-09-08

## 2022-09-08 NOTE — TELEPHONE ENCOUNTER
Patient is calling regarding cancelling an appointment  Date/Time: 09/09 @ 420PM w/ Dr Arnulfo Dumont     Patient was rescheduled: YES [] NO [x]    Patient requesting call back to reschedule: YES [] NO [x] will call back to r/s

## 2022-12-19 ENCOUNTER — NURSE TRIAGE (OUTPATIENT)
Dept: OTHER | Facility: OTHER | Age: 32
End: 2022-12-19

## 2022-12-19 ENCOUNTER — TELEMEDICINE (OUTPATIENT)
Dept: INTERNAL MEDICINE CLINIC | Facility: CLINIC | Age: 32
End: 2022-12-19

## 2022-12-19 VITALS — TEMPERATURE: 98.1 F

## 2022-12-19 DIAGNOSIS — K21.00 GASTROESOPHAGEAL REFLUX DISEASE WITH ESOPHAGITIS WITHOUT HEMORRHAGE: ICD-10-CM

## 2022-12-19 DIAGNOSIS — R50.9 FEVER, UNSPECIFIED FEVER CAUSE: Primary | ICD-10-CM

## 2022-12-19 NOTE — LETTER
December 19, 2022     Patient: Tyler Rolle  YOB: 1990  Date of Visit: 12/19/2022      To Whom it May Concern:    Tyler Rolle is under my professional care  Princess Funez was seen in my office on 12/19/2022  Princess Funez may return to work on next scheduled work day when able  once all testing is complete  If you have any questions or concerns, please don't hesitate to call           Sincerely,          Mark Hernandez MD        CC: No Recipients

## 2022-12-19 NOTE — TELEPHONE ENCOUNTER
Regarding: Possible Flu?  ----- Message from Ivan Gandhi sent at 12/19/2022  7:10 AM EST -----  "My  has been having severe body aches, nausea, vomiting, and low grade fevers   I think he might have the flu "

## 2022-12-19 NOTE — TELEPHONE ENCOUNTER
Patient with n/v, body aches, and low grade fever that started 12/17  Patient states today his temp came down to 98 1  Patient last vomited last night  Patient feels weak and fatigued and thinks he has the flu  Patient scheduled for virtual appt today with PCP, home care advice given  Reason for Disposition  • [1] MILD or MODERATE vomiting AND [2] present > 48 hours (2 days) (Exception: mild vomiting with associated diarrhea)    Answer Assessment - Initial Assessment Questions  1  ONSET: "When did the symptoms start?"       12/17    2  AMOUNT: "How much discharge is there?"      Denies    3  COUGH: "Do you have a cough?" If yes, ask: "Describe the color of your sputum" (clear, white, yellow, green)      Denies    4  RESPIRATORY DISTRESS: "Describe your breathing "       Denies    5  FEVER: "Do you have a fever?" If Yes, ask: "What is your temperature, how was it measured, and when did it start?"      98 1    6  SEVERITY: "Overall, how bad are you feeling right now?" (e g , doesn't interfere with normal activities, staying home from school/work, staying in bed)       Fatigue, like I have the flu    7   OTHER SYMPTOMS: "Do you have any other symptoms?" (e g , sore throat, earache, wheezing, vomiting)     Body aches, n/v    8  PREGNANCY: "Is there any chance you are pregnant?" "When was your last menstrual period?"      N/A    Last time patient vomited was last night around 7pm  Taking tylenol and motrin    Protocols used: VOMITING-ADULT-AH, COMMON COLD-ADULT-AH

## 2022-12-19 NOTE — PROGRESS NOTES
COVID-19 Outpatient Progress Note    Assessment/Plan:    Problem List Items Addressed This Visit        Digestive    Gastroesophageal reflux disease with esophagitis without hemorrhage   Other Visit Diagnoses     Fever, unspecified fever cause    -  Primary    Relevant Orders    Covid/Flu- Office Collect         Disposition:     I have spent 10 minutes directly with the patient  Greater than 50% of this time was spent in counseling/coordination of care regarding: instructions for management  Encounter provider: Roberta Garcia MD     Provider located at: [address] [place] [address] [address]  [address] [facility]  [facility]  [address] [place] [address] [phone]     Recent Visits  No visits were found meeting these conditions  Showing recent visits within past 7 days and meeting all other requirements  Today's Visits  Date Type Provider Dept   12/19/22 Christos Perrin MD [facility] [address] today's visits and meeting all other requirements  Future Appointments  No visits were found meeting these conditions  Showing future appointments within next 150 days and meeting all other requirements     This virtual check-in was done via [address] and patient was informed that this is a secure, HIPAA-compliant platform  He agrees to proceed  Patient agrees to participate in a virtual check in via telephone or video visit instead of presenting to the office to address urgent/immediate medical needs  Patient is aware this is a billable service  He acknowledged consent and understanding of privacy and security of the video platform  The patient has agreed to participate and understands they can discontinue the visit at any time  After connecting through [facility], the patient was identified by name and date of birth  Mary Barney was informed that this was a telemedicine visit and that the exam was being conducted confidentially over secure lines   My office door was closed  No one else was in the room  Yosvany Marie acknowledged consent and understanding of privacy and security of the telemedicine visit  I informed the patient that I have reviewed his record in Epic and presented the opportunity for him to ask any questions regarding the visit today  The patient agreed to participate  Verification of patient location:  Patient is located in the following state in which I hold an active license: [place]    Subjective:   Yosvany Marie is a 33 y o  male who is concerned about COVID-19  Patient's symptoms include fever, fatigue, malaise, nausea, vomiting, myalgias and headache      - Date of symptom onset: 12/18/2022      COVID-19 vaccination status: Not vaccinated    Exposure:     Hospitalized recently for fever and/or lower respiratory symptoms?: No      Currently a healthcare worker that is involved in direct patient care?: No      Works in a special setting where the risk of COVID-19 transmission may be high? (this may include long-term care, correctional and MCC facilities; homeless shelters; assisted-living facilities and group homes ): No      Resident in a special setting where the risk of COVID-19 transmission may be high? (this may include long-term care, correctional and MCC facilities; homeless shelters; assisted-living facilities and group homes ): No      Lab Results   Component Value Date    SARSCOV2 Negative 12/23/2021       Review of Systems   Constitutional: Positive for fatigue and fever  Gastrointestinal: Positive for nausea and vomiting  Musculoskeletal: Positive for myalgias  Neurological: Positive for headaches  All other systems reviewed and are negative      Current Outpatient Medications on File Prior to Visit   Medication Sig   • omeprazole (PriLOSEC) 20 mg delayed release capsule Take 1 capsule (20 mg total) by mouth daily before breakfast   • Pseudoeph-Doxylamine-DM-APAP (NYQUIL PO) Take by mouth   • Pseudoephedrine-APAP-DM (DAYQUIL PO) Take by mouth       Objective:    Temp 98 1 °F (36 7 °C) (Oral)      Physical Exam  Vitals and nursing note reviewed  Constitutional:       Appearance: Normal appearance  He is ill-appearing  Pulmonary:      Effort: Pulmonary effort is normal    Neurological:      Mental Status: He is alert     Psychiatric:         Mood and Affect: Mood normal        Anne Morales MD

## 2022-12-20 LAB
FLUAV RNA RESP QL NAA+PROBE: NEGATIVE
FLUBV RNA RESP QL NAA+PROBE: NEGATIVE
SARS-COV-2 RNA RESP QL NAA+PROBE: NEGATIVE

## 2023-01-23 DIAGNOSIS — K21.00 GASTROESOPHAGEAL REFLUX DISEASE WITH ESOPHAGITIS WITHOUT HEMORRHAGE: ICD-10-CM

## 2023-01-23 RX ORDER — OMEPRAZOLE 20 MG/1
20 CAPSULE, DELAYED RELEASE ORAL
Qty: 30 CAPSULE | Refills: 5 | Status: SHIPPED | OUTPATIENT
Start: 2023-01-23

## 2023-09-05 ENCOUNTER — APPOINTMENT (OUTPATIENT)
Dept: URGENT CARE | Age: 33
End: 2023-09-05
Payer: OTHER MISCELLANEOUS

## 2023-09-05 PROCEDURE — 96372 THER/PROPH/DIAG INJ SC/IM: CPT | Performed by: STUDENT IN AN ORGANIZED HEALTH CARE EDUCATION/TRAINING PROGRAM

## 2023-09-05 PROCEDURE — G0382 LEV 3 HOSP TYPE B ED VISIT: HCPCS | Performed by: STUDENT IN AN ORGANIZED HEALTH CARE EDUCATION/TRAINING PROGRAM

## 2023-09-05 PROCEDURE — 99283 EMERGENCY DEPT VISIT LOW MDM: CPT | Performed by: STUDENT IN AN ORGANIZED HEALTH CARE EDUCATION/TRAINING PROGRAM

## 2023-09-12 DIAGNOSIS — K21.00 GASTROESOPHAGEAL REFLUX DISEASE WITH ESOPHAGITIS WITHOUT HEMORRHAGE: ICD-10-CM

## 2023-09-12 RX ORDER — OMEPRAZOLE 20 MG/1
20 CAPSULE, DELAYED RELEASE ORAL
Qty: 30 CAPSULE | Refills: 1 | Status: SHIPPED | OUTPATIENT
Start: 2023-09-12

## 2023-10-16 ENCOUNTER — OFFICE VISIT (OUTPATIENT)
Age: 33
End: 2023-10-16
Payer: COMMERCIAL

## 2023-10-16 VITALS
TEMPERATURE: 97.5 F | OXYGEN SATURATION: 97 % | BODY MASS INDEX: 34.52 KG/M2 | WEIGHT: 227 LBS | RESPIRATION RATE: 16 BRPM | HEART RATE: 68 BPM

## 2023-10-16 DIAGNOSIS — J20.9 ACUTE BRONCHITIS, UNSPECIFIED ORGANISM: Primary | ICD-10-CM

## 2023-10-16 DIAGNOSIS — J06.9 ACUTE URI: ICD-10-CM

## 2023-10-16 PROCEDURE — 99213 OFFICE O/P EST LOW 20 MIN: CPT | Performed by: PHYSICIAN ASSISTANT

## 2023-10-16 NOTE — PATIENT INSTRUCTIONS
Acute Bronchitis   WHAT YOU NEED TO KNOW:   Acute bronchitis is swelling and irritation in your lungs. It is usually caused by a virus and most often happens in the winter. Bronchitis may also be caused by bacteria or by a chemical irritant, such as smoke. DISCHARGE INSTRUCTIONS:   Return to the emergency department if:   You cough up blood. Your lips or fingernails turn blue. You feel like you are not getting enough air when you breathe. Call your doctor if:   Your symptoms do not go away or get worse, even after treatment. Your cough does not get better within 4 weeks. You have questions or concerns about your condition or care. Medicines: You may need any of the following:  Cough suppressants  decrease your urge to cough. Decongestants  help loosen mucus in your lungs and make it easier to cough up. This can help you breathe easier. Inhalers  may be given. Your healthcare provider may give you one or more inhalers to help you breathe easier and cough less. An inhaler gives you medicine to open your airways. Ask your healthcare provider to show you how to use your inhaler correctly. Antiviral medicine  treats infections caused by a virus. Antibiotics  may be given if your bronchitis is caused by bacteria or if you have lung condition. Acetaminophen  decreases pain and fever. It is available without a doctor's order. Ask how much to take and how often to take it. Follow directions. Read the labels of all other medicines you are using to see if they also contain acetaminophen, or ask your doctor or pharmacist. Acetaminophen can cause liver damage if not taken correctly. NSAIDs  help decrease swelling and pain or fever. This medicine is available with or without a doctor's order. NSAIDs can cause stomach bleeding or kidney problems in certain people. If you take blood thinner medicine, always ask your healthcare provider if NSAIDs are safe for you.  Always read the medicine label and follow directions. Self-care:   Drink liquids as directed. You may need to drink more liquids than usual to stay hydrated. Ask how much liquid to drink each day and which liquids are best for you. Use a cool mist humidifier. This increases air moisture in your home. This may make it easier for you to breathe and help decrease your cough. Get more rest.  Rest helps your body to heal. Slowly start to do more each day. Rest when you feel it is needed. Prevent acute bronchitis:       Ask about vaccines you may need. Get a flu vaccine each year as soon as recommended, usually in September or October. Ask your healthcare provider if you should also get a pneumonia or COVID-19 vaccine. Your healthcare provider can tell you if you should also get other vaccines, and when to get them. Prevent the spread of germs. You can decrease your risk for acute bronchitis and other illnesses by doing the following:     Wash your hands often with soap and water. Carry germ-killing hand lotion or gel with you. You can use the lotion or gel to clean your hands when soap and water are not available. Do not touch your eyes, nose, or mouth unless you have washed your hands first.    Always cover your mouth when you cough to prevent the spread of germs. It is best to cough into a tissue or your shirt sleeve instead of into your hand. Ask those around you to cover their mouths when they cough. Try to avoid people who have a cold or the flu. If you are sick, stay away from others as much as possible. Avoid irritants in the air. Avoid chemicals, fumes, and dust. Wear a face mask if you must work around dust or fumes. Stay inside on days when air pollution levels are high. If you have allergies, stay inside when pollen counts are high. Do not use aerosol products, such as spray-on deodorant, bug spray, and hair spray. Do not smoke or be around others who are smoking.   Nicotine and other chemicals in cigarettes and cigars can cause lung damage. Ask your healthcare provider for information if you currently smoke and need help to quit. E-cigarettes or smokeless tobacco still contain nicotine. Talk to your healthcare provider before you use these products. Follow up with your doctor as directed:  Write down questions you have so you will remember to ask them during your follow-up visits. © Copyright [employer] 2023 Information is for End User's use only and may not be sold, redistributed or otherwise used for commercial purposes. The above information is an  only. It is not intended as medical advice for individual conditions or treatments. Talk to your doctor, nurse or pharmacist before following any medical regimen to see if it is safe and effective for you.

## 2023-10-16 NOTE — PROGRESS NOTES
[facility]        NAME: Carley Jiang is a 29 y.o. male  : 1990    MRN: [id number]  DATE: 2023  TIME: 12:56 PM    Assessment and Plan   Acute bronchitis, unspecified organism [J20.9]  1. Acute bronchitis, unspecified organism  dextromethorphan-guaifenesin (MUCINEX DM)  MG per 12 hr tablet      2. Acute URI  dextromethorphan-guaifenesin (MUCINEX DM)  MG per 12 hr tablet            Patient Instructions       Follow up with PCP in 3-5 days. Proceed to  ER if symptoms worsen. Chief Complaint     Chief Complaint   Patient presents with    Cold Like Symptoms     Pt states he has had a cold for 3 days. Pt has productive cough and runny nose. Pt trying otc medications, which are helping. History of Present Illness       Cough  This is a new problem. The current episode started in the past 7 days. The problem has been gradually improving. The problem occurs every few minutes. The cough is Non-productive. Associated symptoms include nasal congestion, postnasal drip and rhinorrhea. Pertinent negatives include no chest pain, chills, fever, headaches, heartburn, hemoptysis, myalgias, rash, sore throat, shortness of breath or wheezing. The symptoms are aggravated by lying down. He has tried OTC cough suppressant for the symptoms. The treatment provided mild relief. There is no history of asthma or COPD. Review of Systems   Review of Systems   Constitutional:  Negative for activity change, appetite change, chills and fever. HENT:  Positive for postnasal drip and rhinorrhea. Negative for sore throat. Respiratory:  Positive for cough. Negative for hemoptysis, shortness of breath and wheezing. Cardiovascular:  Negative for chest pain. Gastrointestinal:  Negative for abdominal pain, diarrhea, heartburn, nausea and vomiting. Musculoskeletal:  Negative for myalgias. Skin:  Negative for rash. Neurological:  Negative for dizziness, light-headedness and headaches. Current Medications       Current Outpatient Medications:     dextromethorphan-guaifenesin (MUCINEX DM)  MG per 12 hr tablet, Take 1 tablet by mouth every 12 (twelve) hours, Disp: , Rfl:     omeprazole (PriLOSEC) 20 mg delayed release capsule, Take 1 capsule (20 mg total) by mouth daily before breakfast, Disp: 30 capsule, Rfl: 1    Pseudoeph-Doxylamine-DM-APAP (NYQUIL PO), Take by mouth (Patient not taking: Reported on 10/16/2023), Disp: , Rfl:     Pseudoephedrine-APAP-DM (DAYQUIL PO), Take by mouth (Patient not taking: Reported on 10/16/2023), Disp: , Rfl:     Current Allergies     Allergies as of 10/16/2023    (No Known Allergies)            The following portions of the patient's history were reviewed and updated as appropriate: allergies, current medications, past family history, past medical history, past social history, past surgical history and problem list.     History reviewed. No pertinent past medical history. History reviewed. No pertinent surgical history. History reviewed. No pertinent family history. Medications have been verified. Objective   Pulse 68   Temp 97.5 °F (36.4 °C)   Resp 16   Wt 103 kg (227 lb)   SpO2 97%   BMI 34.52 kg/m²        Physical Exam     Physical Exam  Vitals and nursing note reviewed. Constitutional:       General: He is not in acute distress. Appearance: Normal appearance. He is not ill-appearing. HENT:      Head: Normocephalic and atraumatic. Right Ear: Tympanic membrane, ear canal and external ear normal.      Left Ear: Tympanic membrane, ear canal and external ear normal.      Nose: Congestion and rhinorrhea present. Mouth/Throat:      Mouth: Mucous membranes are moist.      Pharynx: No oropharyngeal exudate or posterior oropharyngeal erythema. Comments: Clear post nasal drip  Eyes:      General: No scleral icterus. Extraocular Movements: Extraocular movements intact.       Conjunctiva/sclera: Conjunctivae normal.      Pupils: Pupils are equal, round, and reactive to light. Cardiovascular:      Rate and Rhythm: Normal rate and regular rhythm. Pulses: Normal pulses. Heart sounds: Normal heart sounds. Pulmonary:      Effort: Pulmonary effort is normal. No respiratory distress. Breath sounds: Normal breath sounds. Musculoskeletal:         General: Normal range of motion. Cervical back: Normal range of motion and neck supple. No tenderness. Lymphadenopathy:      Cervical: No cervical adenopathy. Skin:     General: Skin is warm and dry. Findings: No rash. Neurological:      General: No focal deficit present. Mental Status: He is alert and oriented to person, place, and time. Gait: Gait normal.   Psychiatric:         Mood and Affect: Mood normal.         Behavior: Behavior normal.         Thought Content:  Thought content normal.         Judgment: Judgment normal.

## 2023-11-06 ENCOUNTER — DOCUMENTATION (OUTPATIENT)
Dept: INTERNAL MEDICINE CLINIC | Facility: CLINIC | Age: 33
End: 2023-11-06

## 2023-11-06 ENCOUNTER — TELEPHONE (OUTPATIENT)
Dept: INTERNAL MEDICINE CLINIC | Facility: CLINIC | Age: 33
End: 2023-11-06

## 2023-11-06 DIAGNOSIS — J40 BRONCHITIS: Primary | ICD-10-CM

## 2023-11-06 RX ORDER — AZITHROMYCIN 250 MG/1
TABLET, FILM COATED ORAL
Qty: 6 TABLET | Refills: 0 | Status: SHIPPED | OUTPATIENT
Start: 2023-11-06 | End: 2023-11-10

## 2023-11-06 NOTE — TELEPHONE ENCOUNTER
Alberto Hannah called today to see if we would do a work note for [employer]? For today sick note. ...      July hunter

## 2023-11-06 NOTE — LETTER
November 6, 2023     Patient: Krista Machado  YOB: 1990  Date of Visit: 11/6/2023      To Whom it May Concern:    Krista Machado is under my professional care. Yara Ruiz may return to work on 11/7/2023 due to illness . If you have any questions or concerns, please don't hesitate to call.          Sincerely,          Latesha Hernandez MD

## 2023-11-14 DIAGNOSIS — K21.00 GASTROESOPHAGEAL REFLUX DISEASE WITH ESOPHAGITIS WITHOUT HEMORRHAGE: ICD-10-CM

## 2023-11-14 RX ORDER — OMEPRAZOLE 20 MG/1
20 CAPSULE, DELAYED RELEASE ORAL
Qty: 30 CAPSULE | Refills: 0 | Status: SHIPPED | OUTPATIENT
Start: 2023-11-14

## 2023-11-19 ENCOUNTER — OFFICE VISIT (OUTPATIENT)
Age: 33
End: 2023-11-19
Payer: COMMERCIAL

## 2023-11-19 VITALS
OXYGEN SATURATION: 96 % | WEIGHT: 228 LBS | TEMPERATURE: 97.7 F | HEART RATE: 101 BPM | BODY MASS INDEX: 34.67 KG/M2 | RESPIRATION RATE: 18 BRPM

## 2023-11-19 DIAGNOSIS — J02.9 SORE THROAT: ICD-10-CM

## 2023-11-19 DIAGNOSIS — R05.2 SUBACUTE COUGH: Primary | ICD-10-CM

## 2023-11-19 LAB — S PYO AG THROAT QL: NEGATIVE

## 2023-11-19 PROCEDURE — 87880 STREP A ASSAY W/OPTIC: CPT

## 2023-11-19 PROCEDURE — 99214 OFFICE O/P EST MOD 30 MIN: CPT | Performed by: PHYSICIAN ASSISTANT

## 2023-11-19 RX ORDER — ALBUTEROL SULFATE 90 UG/1
2 AEROSOL, METERED RESPIRATORY (INHALATION) EVERY 6 HOURS PRN
Qty: 6.7 G | Refills: 0 | Status: SHIPPED | OUTPATIENT
Start: 2023-11-19

## 2023-11-19 NOTE — PATIENT INSTRUCTIONS
Bronchospasm   WHAT YOU NEED TO KNOW:   Bronchospasm is a narrowing of the airway that usually comes and goes. You may be at risk for bronchospasm if you have a chest cold or allergies. You may also be at risk if you are bothered by air pollution, certain medicines, cold, dry air, smoke, or strong odors. Exercise may worsen your symptoms. Bronchospasms may make it hard for you to breathe. Severe bronchospasm may become life-threatening. DISCHARGE INSTRUCTIONS:   Call your local emergency number (911 in the [address]) if:   You have chest pain. You have severe shortness of breath or trouble breathing. Return to the emergency department if:   You cough or spit up blood. You are short of breath. You have blue fingernails or toenails. Your heartbeat is fast or not even. Call your doctor or pulmonologist if:   You have a fever. You have a cough that will not go away. Your wheezing worsens. You have questions or concerns about your condition or care. Medicines: You may need any of the following:  Bronchodilators  help expand your airway for easier breathing. Some of these medicines may help prevent future spasms. Inhaled steroids  help reduce swelling in your airway and soothe your breathing. These are used for long-term control. Anticholinergics  help relax and open your airway. Take your medicine as directed. Contact your healthcare provider if you think your medicine is not helping or if you have side effects. Tell your provider if you are allergic to any medicine. Keep a list of the medicines, vitamins, and herbs you take. Include the amounts, and when and why you take them. Bring the list or the pill bottles to follow-up visits. Carry your medicine list with you in case of an emergency. Prevent bronchospasms:   Avoid triggers. Your healthcare provider can help you identify your triggers. You may need to keep a diary of your symptoms.  Include where you were and what you were doing when symptoms started. Also include how long symptoms lasted. Make a note of anything that helped or made your symptoms worse. Bring your diary to visits with your healthcare provider. He or she may also recommend skin prick tests or other tests to help find triggers. Warm up before you exercise. Ask your healthcare provider about the best exercise plan for you. Keep your immune system healthy. Try to avoid people who are sick. Ask your healthcare provider about vaccines you may need. Vaccines help prevent certain infections that can cause breathing problems. Get a flu vaccine as soon as recommended each year, usually in September or October. Vaccines are also available to prevent COVID-19 and pneumonia. Your provider can tell you if you also need other vaccines, and when to get them. Breathe through your nose when you are in cold, dry air or weather. This may help reduce lung irritation by warming the air before it reaches your lungs. Follow up with your doctor or pulmonologist as directed: You may need more testing to find the cause of your condition. Write down your questions so you remember to ask them during your visits. © Copyright Ethan Abts 2023 Information is for End User's use only and may not be sold, redistributed or otherwise used for commercial purposes. The above information is an  only. It is not intended as medical advice for individual conditions or treatments. Talk to your doctor, nurse or pharmacist before following any medical regimen to see if it is safe and effective for you.

## 2023-11-19 NOTE — PROGRESS NOTES
[facility]        NAME: Angela Alvarado is a 29 y.o. male  : 1990    MRN: [id number]  DATE: 2023  TIME: 2:59 PM    Assessment and Plan   Subacute cough [R05.2]  1. Subacute cough  albuterol (Proventil HFA) 90 mcg/act inhaler      2. Sore throat  POCT rapid strepA    albuterol (Proventil HFA) 90 mcg/act inhaler            Patient Instructions     Discussed vaping cessation  Discussed following up with gastroenterology to rule out any GI causes of cough  Albuterol MDI as directed  Follow up with PCP in 3-5 days. Proceed to  ER if symptoms worsen. Chief Complaint     Chief Complaint   Patient presents with    Cough     Pt states he has had a cough for 3 weeks. Pt was on z pack for 5 days. History of Present Illness       Cough  This is a new problem. The current episode started 1 to 4 weeks ago. The problem has been waxing and waning. The cough is Non-productive. Associated symptoms include postnasal drip and rhinorrhea. Pertinent negatives include no chest pain, chills, fever, headaches, rash, sore throat, shortness of breath or wheezing. The symptoms are aggravated by lying down and cold air (Vaping). He has tried OTC cough suppressant for the symptoms. There is no history of asthma, COPD or emphysema. Review of Systems   Review of Systems   Constitutional:  Negative for activity change, appetite change, chills, fatigue and fever. HENT:  Positive for congestion, postnasal drip and rhinorrhea. Negative for sore throat. Eyes:  Negative for photophobia and visual disturbance. Respiratory:  Positive for cough. Negative for chest tightness, shortness of breath and wheezing. Cardiovascular:  Negative for chest pain and palpitations. Gastrointestinal:  Negative for abdominal pain, diarrhea, nausea and vomiting. Musculoskeletal:  Negative for back pain and neck stiffness. Skin:  Negative for color change and rash.    Neurological:  Negative for dizziness, weakness and headaches. Psychiatric/Behavioral: Negative. Current Medications       Current Outpatient Medications:     albuterol (Proventil HFA) 90 mcg/act inhaler, Inhale 2 puffs every 6 (six) hours as needed for wheezing or shortness of breath, Disp: 6.7 g, Rfl: 0    dextromethorphan-guaifenesin (MUCINEX DM)  MG per 12 hr tablet, Take 1 tablet by mouth every 12 (twelve) hours, Disp: , Rfl:     omeprazole (PriLOSEC) 20 mg delayed release capsule, Take 1 capsule (20 mg total) by mouth daily before breakfast, Disp: 30 capsule, Rfl: 0    Pseudoeph-Doxylamine-DM-APAP (NYQUIL PO), Take by mouth (Patient not taking: Reported on 10/16/2023), Disp: , Rfl:     Pseudoephedrine-APAP-DM (DAYQUIL PO), Take by mouth (Patient not taking: Reported on 10/16/2023), Disp: , Rfl:     Current Allergies     Allergies as of 11/19/2023    (No Known Allergies)            The following portions of the patient's history were reviewed and updated as appropriate: allergies, current medications, past family history, past medical history, past social history, past surgical history and problem list.     History reviewed. No pertinent past medical history. History reviewed. No pertinent surgical history. History reviewed. No pertinent family history. Medications have been verified. Objective   Pulse 101   Temp 97.7 °F (36.5 °C)   Resp 18   Wt 103 kg (228 lb)   SpO2 96%   BMI 34.67 kg/m²        Physical Exam     Physical Exam  Vitals and nursing note reviewed. Constitutional:       General: He is not in acute distress. Appearance: Normal appearance. He is normal weight. He is not ill-appearing. HENT:      Head: Normocephalic and atraumatic. Right Ear: Tympanic membrane, ear canal and external ear normal.      Left Ear: Tympanic membrane, ear canal and external ear normal.      Nose: Congestion present. Mouth/Throat:      Mouth: Mucous membranes are moist.      Pharynx: Oropharynx is clear.  No oropharyngeal exudate or posterior oropharyngeal erythema. Eyes:      General: No scleral icterus. Extraocular Movements: Extraocular movements intact. Conjunctiva/sclera: Conjunctivae normal.      Pupils: Pupils are equal, round, and reactive to light. Cardiovascular:      Rate and Rhythm: Normal rate and regular rhythm. Pulses: Normal pulses. Heart sounds: Normal heart sounds. Pulmonary:      Effort: Pulmonary effort is normal. No respiratory distress. Breath sounds: Rhonchi present. No wheezing or rales. Musculoskeletal:         General: Normal range of motion. Cervical back: Normal range of motion and neck supple. No tenderness. Lymphadenopathy:      Cervical: No cervical adenopathy. Skin:     General: Skin is warm and dry. Findings: No rash. Neurological:      General: No focal deficit present. Mental Status: He is alert and oriented to person, place, and time. Coordination: Coordination normal.      Gait: Gait normal.   Psychiatric:         Mood and Affect: Mood normal.         Behavior: Behavior normal.         Thought Content:  Thought content normal.         Judgment: Judgment normal.

## 2023-12-07 ENCOUNTER — AMB VIDEO VISIT (OUTPATIENT)
Dept: OTHER | Facility: HOSPITAL | Age: 33
End: 2023-12-07

## 2023-12-07 VITALS — RESPIRATION RATE: 16 BRPM

## 2023-12-07 DIAGNOSIS — R09.82 POST-NASAL DRIP: Primary | ICD-10-CM

## 2023-12-07 PROCEDURE — ECARE PR SL URGENT CARE VIRTUAL VISIT: Performed by: NURSE PRACTITIONER

## 2023-12-07 RX ORDER — FLUTICASONE PROPIONATE 50 MCG
1 SPRAY, SUSPENSION (ML) NASAL DAILY
Qty: 16 G | Refills: 0 | Status: SHIPPED | OUTPATIENT
Start: 2023-12-07 | End: 2023-12-14

## 2023-12-07 NOTE — LETTER
December 7, 2023     Angela Alvarado    Patient: Angela Alvarado   YOB: 1990   Date of Visit: 12/7/2023 December 7, 2023     Patient: Angela Alvarado   YOB: 1990   Date of Visit: 12/7/2023       To Whom it May Concern:    Angela Alvarado is under my professional care. He was seen virtually on 12/7/2023. He may return to work on 12/08/2023 . If you have any questions or concerns, please don't hesitate to call.          Sincerely,          LATRICE Walter        CC: No Recipients

## 2023-12-07 NOTE — CARE ANYWHERE EVISITS
Visit Summary for [facility] . [facility] - Gender: Male - Date of Birth: [id number]  Date: [id number] - Duration: 6 minutes  Patient: [facility] . [facility]  Provider: Ruby POLLARD    Patient Contact Information  Address  [address]; [address] [facility]  [phone]    Visit Topics  Cold [Added By: Self - 2023-12-07]    Triage Questions   What is your current physical address in the event of a medical emergency? Answer []  Are you allergic to any medications? Answer []  Are you now or could you be pregnant? Answer []  Do you have any immune system compromise or chronic lung   disease? Answer []  Do you have any vulnerable family members in the home (infant, pregnant, cancer, elderly)? Answer []     Conversation Transcripts  [0A][0A] [Notification] You are connected with Kasandra Butcher, Urgent Care Specialist.[0A][Notification] [facility] is located in [place]. [0A][Notification] [facility] has shared health history. Lavon Royal .[0A]    Diagnosis  Postnasal drip    Procedures  Value: [id number] Code: CPT-4 UNLISTED E&M SERVICE    Medications Prescribed    No prescriptions ordered    Electronically signed by: Kasandra Mcgrath(NPI [id number])

## 2023-12-07 NOTE — PROGRESS NOTES
Required Documentation:  Encounter provider LATRICE Mcguire    Provider located at [facility] [id number]    Identify all parties in room with patient during virtual visit:  No one else    The patient was identified by name and date of birth. Mary Mcneal was informed that this is a telemedicine visit and that the visit is being conducted through the [address] [place] [address] Dr platform. He agrees to proceed. .  My office door was closed. No one else was in the room. He acknowledged consent and understanding of privacy and security of the video platform. The patient has agreed to participate and understands they can discontinue the visit at any time. Verification of patient location:    Patient is located at home in the following state in which I hold an active license [place]    Patient is aware this is a billable service. Reason for visit is No chief complaint on file. Subjective  This is a 29year old male here today for video visit. He states he started  a couple weeks ago with congestion and sinus pressure. He has been taking OTC medication mucinex. He states he has symptoms and then the came back yesterday. He states normal cough was gone. He states he did stay home today. He will need a note for work. No past medical history on file. No past surgical history on file. No Known Allergies    Review of Systems   Constitutional:  Negative for activity change, chills, fatigue and fever. HENT:  Positive for congestion, postnasal drip and rhinorrhea. Respiratory:  Positive for cough. Neurological: Negative. Psychiatric/Behavioral: Negative. Video Exam    Vitals:    12/07/23 1138   Resp: 16       Physical Exam  Constitutional:       General: He is not in acute distress. Appearance: Normal appearance. He is not ill-appearing or toxic-appearing.    HENT:      Head: Normocephalic and atraumatic. Eyes:      Conjunctiva/sclera: Conjunctivae normal.   Pulmonary:      Effort: Pulmonary effort is normal. No respiratory distress. Skin:     Comments: No rash on head or neck. Neurological:      Mental Status: He is alert and oriented to person, place, and time. Psychiatric:         Mood and Affect: Mood normal.         Behavior: Behavior normal.         Thought Content: Thought content normal.         Judgment: Judgment normal.         Visit Time  Total Visit Duration: 8 minutes    Assessment/Plan:    Diagnoses and all orders for this visit:    Post-nasal drip  -     fluticasone (FLONASE) 50 mcg/act nasal spray; 1 spray into each nostril daily for 7 days        Patient Instructions   As we discussed your illness is viral.  No antibiotics are indicated at this time. Rest and drink extra fluids. OTC cough and cold medications as needed. Tylenol or Motrin as needed for pain or fever. Salt water gargles and throat lozenges for sore throat. Follow up with PCP if no improvement. Go to ER with worsening symptoms. Cold Symptoms   WHAT YOU NEED TO KNOW:   A cold is an infection caused by a virus. The infection causes your upper respiratory system to become inflamed. Common symptoms of a cold include sneezing, dry throat, a stuffy nose, headache, watery eyes, and a cough. Your cough may be dry, or you may cough up mucus. You may also have muscle aches, joint pain, and tiredness. Rarely, you may have a fever. Most colds go away without treatment. DISCHARGE INSTRUCTIONS:   Return to the emergency department if:   You have increased tiredness and weakness. You are unable to eat. Your heart is beating much faster than usual for you. You see white spots in the back of your throat and your neck is swollen and sore to the touch. You see pinpoint or larger reddish-purple dots on your skin. Contact your healthcare provider if:   You have a fever higher than 102°F (38.9°C).     You have new or worsening shortness of breath. You have thick nasal drainage for more than 2 days. Your symptoms do not improve or get worse within 5 days. You have questions or concerns about your condition or care. Medicines: The following medicines may be suggested by your healthcare provider to decrease your cold symptoms. These medicines are available without a doctor's order. Ask which medicines to take and when to take them. Follow directions. NSAIDs or acetaminophen  help to bring down a fever or decrease pain. Decongestants  help decrease nasal stuffiness. Antihistamines  help decrease sneezing and a runny nose. Cough suppressants  help decrease how much you cough. Expectorants  help loosen mucus so you can cough it up. Take your medicine as directed. Contact your healthcare provider if you think your medicine is not helping or if you have side effects. Tell him of her if you are allergic to any medicine. Keep a list of the medicines, vitamins, and herbs you take. Include the amounts, and when and why you take them. Bring the list or the pill bottles to follow-up visits. Carry your medicine list with you in case of an emergency. Symptom relief: The following may help relieve cold symptoms, such as a dry throat and congestion:  Gargle with mouthwash or warm salt water as directed. Suck on throat lozenges or hard candy. Use a cold or warm vaporizer or humidifier to ease your breathing. Rest for at least 2 days and then as needed to decrease tiredness and weakness. Use petroleum based jelly around your nostrils to decrease irritation from blowing your nose. Drink liquids:  Liquids will help thin and loosen thick mucus so you can cough it up. Liquids will also keep you hydrated. Ask your healthcare provider which liquids are best for you and how much to drink each day. Prevent the spread of germs:   You can spread your cold germs to others for at least 3 days after your symptoms start. Wash your hands often. Do not share items, such as eating utensils. Cover your nose and mouth when you cough or sneeze using the crook of your elbow instead of your hands. Throw used tissues in the garbage. Do not smoke:  Smoking may worsen your symptoms and increase the length of time you feel sick. Talk with your healthcare provider if you need help to stop smoking. Follow up with your healthcare provider as directed:  Write down your questions so you remember to ask them during your visits. © 2017 [address] [place] [address] [employer] Information is for End User's use only and may not be sold, redistributed or otherwise used for commercial purposes. All illustrations and images included in CareNotes® are the copyrighted property of A.D.A.M., Inc. or Lorenzo Jackson. The above information is an  only. It is not intended as medical advice for individual conditions or treatments. Talk to your doctor, nurse or pharmacist before following any medical regimen to see if it is safe and effective for you.

## 2023-12-07 NOTE — PATIENT INSTRUCTIONS
As we discussed your illness is viral.  No antibiotics are indicated at this time. Rest and drink extra fluids. OTC cough and cold medications as needed. Tylenol or Motrin as needed for pain or fever. Salt water gargles and throat lozenges for sore throat. Follow up with PCP if no improvement. Go to ER with worsening symptoms. Cold Symptoms   WHAT YOU NEED TO KNOW:   A cold is an infection caused by a virus. The infection causes your upper respiratory system to become inflamed. Common symptoms of a cold include sneezing, dry throat, a stuffy nose, headache, watery eyes, and a cough. Your cough may be dry, or you may cough up mucus. You may also have muscle aches, joint pain, and tiredness. Rarely, you may have a fever. Most colds go away without treatment. DISCHARGE INSTRUCTIONS:   Return to the emergency department if:   You have increased tiredness and weakness. You are unable to eat. Your heart is beating much faster than usual for you. You see white spots in the back of your throat and your neck is swollen and sore to the touch. You see pinpoint or larger reddish-purple dots on your skin. Contact your healthcare provider if:   You have a fever higher than 102°F (38.9°C). You have new or worsening shortness of breath. You have thick nasal drainage for more than 2 days. Your symptoms do not improve or get worse within 5 days. You have questions or concerns about your condition or care. Medicines: The following medicines may be suggested by your healthcare provider to decrease your cold symptoms. These medicines are available without a doctor's order. Ask which medicines to take and when to take them. Follow directions. NSAIDs or acetaminophen  help to bring down a fever or decrease pain. Decongestants  help decrease nasal stuffiness. Antihistamines  help decrease sneezing and a runny nose. Cough suppressants  help decrease how much you cough.     Expectorants help loosen mucus so you can cough it up. Take your medicine as directed. Contact your healthcare provider if you think your medicine is not helping or if you have side effects. Tell him of her if you are allergic to any medicine. Keep a list of the medicines, vitamins, and herbs you take. Include the amounts, and when and why you take them. Bring the list or the pill bottles to follow-up visits. Carry your medicine list with you in case of an emergency. Symptom relief: The following may help relieve cold symptoms, such as a dry throat and congestion:  Gargle with mouthwash or warm salt water as directed. Suck on throat lozenges or hard candy. Use a cold or warm vaporizer or humidifier to ease your breathing. Rest for at least 2 days and then as needed to decrease tiredness and weakness. Use petroleum based jelly around your nostrils to decrease irritation from blowing your nose. Drink liquids:  Liquids will help thin and loosen thick mucus so you can cough it up. Liquids will also keep you hydrated. Ask your healthcare provider which liquids are best for you and how much to drink each day. Prevent the spread of germs: You can spread your cold germs to others for at least 3 days after your symptoms start. Wash your hands often. Do not share items, such as eating utensils. Cover your nose and mouth when you cough or sneeze using the crook of your elbow instead of your hands. Throw used tissues in the garbage. Do not smoke:  Smoking may worsen your symptoms and increase the length of time you feel sick. Talk with your healthcare provider if you need help to stop smoking. Follow up with your healthcare provider as directed:  Write down your questions so you remember to ask them during your visits. © 2017 [address] [place] [address] [place] Information is for End User's use only and may not be sold, redistributed or otherwise used for commercial purposes.  All illustrations and images included in CareNotes® are the copyrighted property of [employer] or [employer]. The above information is an  only. It is not intended as medical advice for individual conditions or treatments. Talk to your doctor, nurse or pharmacist before following any medical regimen to see if it is safe and effective for you.

## 2023-12-26 ENCOUNTER — OFFICE VISIT (OUTPATIENT)
Age: 33
End: 2023-12-26
Payer: COMMERCIAL

## 2023-12-26 ENCOUNTER — RA CDI HCC (OUTPATIENT)
Dept: OTHER | Facility: HOSPITAL | Age: 33
End: 2023-12-26

## 2023-12-26 VITALS
TEMPERATURE: 97.2 F | SYSTOLIC BLOOD PRESSURE: 118 MMHG | DIASTOLIC BLOOD PRESSURE: 78 MMHG | OXYGEN SATURATION: 98 % | RESPIRATION RATE: 18 BRPM | HEART RATE: 68 BPM | BODY MASS INDEX: 35.73 KG/M2 | WEIGHT: 235 LBS

## 2023-12-26 DIAGNOSIS — H65.02 NON-RECURRENT ACUTE SEROUS OTITIS MEDIA OF LEFT EAR: Primary | ICD-10-CM

## 2023-12-26 PROBLEM — M25.562 PAIN IN LEFT KNEE: Status: ACTIVE | Noted: 2019-12-11

## 2023-12-26 PROCEDURE — 99213 OFFICE O/P EST LOW 20 MIN: CPT

## 2023-12-26 RX ORDER — AMOXICILLIN 500 MG/1
500 CAPSULE ORAL EVERY 8 HOURS SCHEDULED
Qty: 21 CAPSULE | Refills: 0 | Status: SHIPPED | OUTPATIENT
Start: 2023-12-26 | End: 2024-01-02

## 2023-12-26 NOTE — PROGRESS NOTES
[facility]        NAME: Tom Sanchez is a 33 y.o. male  : 1990    MRN: [id number]  DATE: 2023  TIME: 10:09 AM    Assessment and Plan   Non-recurrent acute serous otitis media of left ear [H65.02]  1. Non-recurrent acute serous otitis media of left ear  amoxicillin (AMOXIL) 500 mg capsule        Physical exam consistent with otitis media of left ear, antibiotics as directed. VSS in clinic, appears in no acute distress. Educated on use of OTC products for additional relief of symptoms. Advised close follow-up with PCP or to report to the ER if symptoms worsen. Patient verbalizes understanding and agreeable to plan.     Patient Instructions     Take antibiotics as directed for next 7 days, complete entire course even if feeling better. May take tylenol/ibuprofen every 4-6 hours as needed for pain and fever and continue other medications as previously prescribed. Follow-up with PCP in 3-5 days if no improvement of symptoms. Report to ER if symptoms worsen.      Chief Complaint     Chief Complaint   Patient presents with    Earache     Pt states his left ear has been painful/muffled hearing for 3 days.          History of Present Illness       33 year old male presents for evaluation of left ear pain for the past 3 days. He denies any known sick contacts or triggers. He denies prior history of ear infection or ear trauma. He reports decreased hearing and a generalized headache but denies associated tinnitus, ear discharge, fevers, cough, or congestion. He has tried tylenol, ibuprofen, and OTC ear drops for symptoms with minimal improvement.    Earache   There is pain in the left ear. This is a new problem. The current episode started in the past 7 days. The problem occurs constantly. The problem has been unchanged. There has been no fever. The pain is at a severity of 8/10. The pain is moderate. Associated symptoms include headaches and hearing loss. Pertinent negatives include no abdominal  pain, coughing, ear discharge, neck pain, rash, rhinorrhea, sore throat or vomiting. He has tried acetaminophen and NSAIDs for the symptoms. The treatment provided no relief. There is no history of a chronic ear infection, hearing loss or a tympanostomy tube.       Review of Systems   Review of Systems   Constitutional:  Negative for activity change, appetite change, chills, fatigue and fever.   HENT:  Positive for ear pain and hearing loss. Negative for congestion, ear discharge, postnasal drip, rhinorrhea, sinus pressure, sinus pain, sneezing, sore throat and trouble swallowing.    Respiratory:  Negative for cough, chest tightness and shortness of breath.    Cardiovascular:  Negative for chest pain and palpitations.   Gastrointestinal:  Negative for abdominal pain, constipation, nausea and vomiting.   Musculoskeletal:  Negative for arthralgias, back pain, myalgias and neck pain.   Skin:  Negative for color change, pallor and rash.   Allergic/Immunologic: Negative for environmental allergies and food allergies.   Neurological:  Positive for headaches. Negative for dizziness and light-headedness.         Current Medications       Current Outpatient Medications:     albuterol (Proventil HFA) 90 mcg/act inhaler, Inhale 2 puffs every 6 (six) hours as needed for wheezing or shortness of breath, Disp: 6.7 g, Rfl: 0    amoxicillin (AMOXIL) 500 mg capsule, Take 1 capsule (500 mg total) by mouth every 8 (eight) hours for 7 days, Disp: 21 capsule, Rfl: 0    omeprazole (PriLOSEC) 20 mg delayed release capsule, Take 1 capsule (20 mg total) by mouth daily before breakfast, Disp: 30 capsule, Rfl: 0    dextromethorphan-guaifenesin (MUCINEX DM)  MG per 12 hr tablet, Take 1 tablet by mouth every 12 (twelve) hours (Patient not taking: Reported on 12/26/2023), Disp: , Rfl:     fluticasone (FLONASE) 50 mcg/act nasal spray, 1 spray into each nostril daily for 7 days, Disp: 16 g, Rfl: 0    Pseudoeph-Doxylamine-DM-APAP (NYQUIL PO),  Take by mouth (Patient not taking: Reported on 10/16/2023), Disp: , Rfl:     Pseudoephedrine-APAP-DM (DAYQUIL PO), Take by mouth (Patient not taking: Reported on 10/16/2023), Disp: , Rfl:     Current Allergies     Allergies as of 12/26/2023    (No Known Allergies)            The following portions of the patient's history were reviewed and updated as appropriate: allergies, current medications, past family history, past medical history, past social history, past surgical history and problem list.     History reviewed. No pertinent past medical history.    History reviewed. No pertinent surgical history.    History reviewed. No pertinent family history.      Medications have been verified.        Objective   /78   Pulse 68   Temp (!) 97.2 °F (36.2 °C)   Resp 18   Wt 107 kg (235 lb)   SpO2 98%   BMI 35.73 kg/m²        Physical Exam     Physical Exam  Vitals and nursing note reviewed.   Constitutional:       General: He is awake. He is not in acute distress.     Appearance: Normal appearance. He is well-developed and normal weight.   HENT:      Head: Normocephalic and atraumatic.      Right Ear: Hearing, ear canal and external ear normal. No tenderness. A middle ear effusion is present.      Left Ear: Ear canal and external ear normal. Decreased hearing noted. Tenderness present.  No middle ear effusion. Tympanic membrane is erythematous. Tympanic membrane is not retracted or bulging. Tympanic membrane has decreased mobility.      Nose: No congestion or rhinorrhea.      Right Turbinates: Not enlarged, swollen or pale.      Left Turbinates: Not enlarged, swollen or pale.      Right Sinus: No maxillary sinus tenderness or frontal sinus tenderness.      Left Sinus: No maxillary sinus tenderness or frontal sinus tenderness.      Mouth/Throat:      Lips: Pink.      Mouth: Mucous membranes are moist.      Pharynx: Oropharynx is clear. Uvula midline. No oropharyngeal exudate or posterior oropharyngeal erythema.    Eyes:      Conjunctiva/sclera: Conjunctivae normal.   Cardiovascular:      Rate and Rhythm: Normal rate and regular rhythm.      Pulses: Normal pulses.      Heart sounds: Normal heart sounds.   Pulmonary:      Effort: Pulmonary effort is normal.      Breath sounds: Normal breath sounds.   Musculoskeletal:      Cervical back: Full passive range of motion without pain, normal range of motion and neck supple.   Lymphadenopathy:      Cervical: No cervical adenopathy.   Skin:     General: Skin is warm and dry.   Neurological:      General: No focal deficit present.      Mental Status: He is alert and oriented to person, place, and time.   Psychiatric:         Mood and Affect: Mood normal.         Behavior: Behavior normal. Behavior is cooperative.         Thought Content: Thought content normal.         Judgment: Judgment normal.

## 2023-12-26 NOTE — PROGRESS NOTES
HCC coding opportunities       Chart reviewed, no opportunity found: CHART REVIEWED, NO OPPORTUNITY FOUND        Patients Insurance        Commercial Insurance: [employer]        Orders received. Chart reviewed. PT assessment not performed at this time as pt reports she is too nauseous to participate at this time. Will attempt PT assessment at another time once pt's schedule allows. Attempted again at 7:40pm pt continues to have nausea and does not want to get up at this time. Will f/u with pt tomorrow once pt's schedule allows.

## 2023-12-26 NOTE — PATIENT INSTRUCTIONS
Take antibiotics as directed for next 7 days, complete entire course even if feeling better. May take tylenol/ibuprofen every 4-6 hours as needed for pain and fever and continue other medications as previously prescribed. Follow-up with PCP in 3-5 days if no improvement of symptoms. Report to ER if symptoms worsen.

## 2024-01-04 ENCOUNTER — OFFICE VISIT (OUTPATIENT)
Age: 34
End: 2024-01-04
Payer: COMMERCIAL

## 2024-01-04 VITALS
BODY MASS INDEX: 36.16 KG/M2 | HEART RATE: 65 BPM | TEMPERATURE: 97.6 F | OXYGEN SATURATION: 96 % | RESPIRATION RATE: 18 BRPM | DIASTOLIC BLOOD PRESSURE: 77 MMHG | WEIGHT: 237.8 LBS | SYSTOLIC BLOOD PRESSURE: 120 MMHG

## 2024-01-04 DIAGNOSIS — H93.12 TINNITUS OF LEFT EAR: ICD-10-CM

## 2024-01-04 DIAGNOSIS — H71.92 CHOLESTEATOMA OF LEFT EAR: Primary | ICD-10-CM

## 2024-01-04 PROCEDURE — 99213 OFFICE O/P EST LOW 20 MIN: CPT | Performed by: PHYSICIAN ASSISTANT

## 2024-01-04 RX ORDER — PSEUDOEPHEDRINE HCL 120 MG/1
120 TABLET, FILM COATED, EXTENDED RELEASE ORAL 2 TIMES DAILY
Qty: 10 TABLET | Refills: 0 | Status: SHIPPED | OUTPATIENT
Start: 2024-01-04

## 2024-01-04 NOTE — PROGRESS NOTES
[facility]        NAME: Tom Sanchez is a 33 y.o. male  : 1990    MRN: [id number]  DATE: 2024  TIME: 8:55 AM    Assessment and Plan   Cholesteatoma of left ear [H71.92]  1. Cholesteatoma of left ear  Ambulatory Referral to Otolaryngology    neomycin-polymyxin-hydrocortisone (CORTISPORIN) otic solution    pseudoephedrine (SUDAFED) 120 MG 12 hr tablet      2. Tinnitus of left ear  Ambulatory Referral to Otolaryngology    neomycin-polymyxin-hydrocortisone (CORTISPORIN) otic solution    pseudoephedrine (SUDAFED) 120 MG 12 hr tablet            Patient Instructions       Discussed the possibility of cholesteatoma of the left ear  Cortisporin otic drops as directed  Continue your amoxicillin as directed  Sudafed as directed  ENT referral generated on your behalf    Do not follow-up here at urgent care but either with your primary care provider or ENT as directed.  If your symptoms worsen go immediately to the emergency department.    Expressed verbal understanding.      Chief Complaint     Chief Complaint   Patient presents with    Earache     Left earache started about 2 weeks ago. Patient is currently on medication for ear pain but complained that symptoms are not getting any better.          History of Present Illness       3-year-old male is presenting today with complaint of persistent left ear pain.  Pain associated with occasional tendinitis and fullness.  Was placed on amoxicillin a week ago for symptoms of OT which the patient reports has improved however, has not completely resolved.  He denies vertigo, dizziness, lightheadedness, headaches, visual disturbances nausea vomiting or weakness.        Review of Systems   Review of Systems   Constitutional:  Negative for activity change, appetite change and fever.   HENT:  Positive for congestion.    Respiratory:  Negative for cough.    Neurological:  Negative for dizziness, weakness, light-headedness, numbness and headaches.         Current  Medications       Current Outpatient Medications:     albuterol (Proventil HFA) 90 mcg/act inhaler, Inhale 2 puffs every 6 (six) hours as needed for wheezing or shortness of breath, Disp: 6.7 g, Rfl: 0    neomycin-polymyxin-hydrocortisone (CORTISPORIN) otic solution, Administer 4 drops into the left ear every 8 (eight) hours for 7 days, Disp: 5 mL, Rfl: 0    omeprazole (PriLOSEC) 20 mg delayed release capsule, Take 1 capsule (20 mg total) by mouth daily before breakfast, Disp: 30 capsule, Rfl: 0    pseudoephedrine (SUDAFED) 120 MG 12 hr tablet, Take 1 tablet (120 mg total) by mouth 2 (two) times a day, Disp: 10 tablet, Rfl: 0    dextromethorphan-guaifenesin (MUCINEX DM)  MG per 12 hr tablet, Take 1 tablet by mouth every 12 (twelve) hours (Patient not taking: Reported on 12/26/2023), Disp: , Rfl:     fluticasone (FLONASE) 50 mcg/act nasal spray, 1 spray into each nostril daily for 7 days, Disp: 16 g, Rfl: 0    Current Allergies     Allergies as of 01/04/2024    (No Known Allergies)            The following portions of the patient's history were reviewed and updated as appropriate: allergies, current medications, past family history, past medical history, past social history, past surgical history and problem list.     History reviewed. No pertinent past medical history.    History reviewed. No pertinent surgical history.    History reviewed. No pertinent family history.      Medications have been verified.        Objective   /77   Pulse 65   Temp 97.6 °F (36.4 °C)   Resp 18   Wt 108 kg (237 lb 12.8 oz)   SpO2 96%   BMI 36.16 kg/m²        Physical Exam     Physical Exam  Vitals and nursing note reviewed.   Constitutional:       General: He is not in acute distress.     Appearance: Normal appearance. He is not ill-appearing, toxic-appearing or diaphoretic.   HENT:      Right Ear: Tympanic membrane, ear canal and external ear normal.      Left Ear: Ear canal and external ear normal.      Ears:       Comments: Left ear canal with puffy yellow-white lesions localized in the region of 9 to 1 o'clock position on the TM     Nose: Congestion present. No rhinorrhea.      Mouth/Throat:      Mouth: Mucous membranes are moist.      Pharynx: Oropharynx is clear.   Eyes:      General: No scleral icterus.     Extraocular Movements: Extraocular movements intact.      Conjunctiva/sclera: Conjunctivae normal.      Pupils: Pupils are equal, round, and reactive to light.   Cardiovascular:      Rate and Rhythm: Normal rate and regular rhythm.      Pulses: Normal pulses.   Pulmonary:      Effort: Pulmonary effort is normal. No respiratory distress.   Musculoskeletal:         General: Normal range of motion.      Cervical back: Normal range of motion and neck supple.   Skin:     General: Skin is warm and dry.   Neurological:      Mental Status: He is alert and oriented to person, place, and time.      GCS: GCS eye subscore is 4. GCS verbal subscore is 5. GCS motor subscore is 6.      Cranial Nerves: Cranial nerves 2-12 are intact.      Motor: Motor function is intact.      Coordination: Coordination is intact. Coordination normal.      Gait: Gait normal.   Psychiatric:         Mood and Affect: Mood normal.         Behavior: Behavior normal.

## 2024-01-04 NOTE — LETTER
January 4, 2024     Patient: Tom Sanchez   YOB: 1990   Date of Visit: 1/4/2024       To Whom it May Concern:    Tom Sanchez was seen in my clinic on 1/4/2024. He may return to work on 1/5/2024 .    If you have any questions or concerns, please don't hesitate to call.         Sincerely,          Himanshu Carranza PA-C        CC: No Recipients

## 2024-04-08 ENCOUNTER — HOSPITAL ENCOUNTER (OUTPATIENT)
Dept: RADIOLOGY | Facility: MEDICAL CENTER | Age: 34
Discharge: HOME | End: 2024-04-08
Payer: COMMERCIAL

## 2024-04-08 ENCOUNTER — OFFICE VISIT (OUTPATIENT)
Dept: INTERNAL MEDICINE CLINIC | Facility: CLINIC | Age: 34
End: 2024-04-08
Payer: COMMERCIAL

## 2024-04-08 VITALS
DIASTOLIC BLOOD PRESSURE: 76 MMHG | HEART RATE: 70 BPM | SYSTOLIC BLOOD PRESSURE: 122 MMHG | BODY MASS INDEX: 34.36 KG/M2 | HEIGHT: 69 IN | OXYGEN SATURATION: 97 % | WEIGHT: 232 LBS | TEMPERATURE: 98 F

## 2024-04-08 DIAGNOSIS — R10.32 LEFT LOWER QUADRANT ABDOMINAL PAIN: Primary | ICD-10-CM

## 2024-04-08 DIAGNOSIS — F41.9 ANXIETY: ICD-10-CM

## 2024-04-08 DIAGNOSIS — R10.32 LEFT LOWER QUADRANT ABDOMINAL PAIN: ICD-10-CM

## 2024-04-08 DIAGNOSIS — K57.92 DIVERTICULITIS: ICD-10-CM

## 2024-04-08 PROBLEM — M25.562 PAIN IN LEFT KNEE: Status: RESOLVED | Noted: 2019-12-11 | Resolved: 2024-04-08

## 2024-04-08 PROBLEM — R07.89 SENSATION OF CHEST PRESSURE: Status: RESOLVED | Noted: 2021-12-17 | Resolved: 2024-04-08

## 2024-04-08 PROBLEM — R00.2 PALPITATIONS: Status: RESOLVED | Noted: 2021-12-17 | Resolved: 2024-04-08

## 2024-04-08 PROCEDURE — 99213 OFFICE O/P EST LOW 20 MIN: CPT | Performed by: INTERNAL MEDICINE

## 2024-04-08 PROCEDURE — 74177 CT ABD & PELVIS W/CONTRAST: CPT

## 2024-04-08 RX ORDER — AMOXICILLIN AND CLAVULANATE POTASSIUM 875; 125 MG/1; MG/1
1 TABLET, FILM COATED ORAL EVERY 12 HOURS SCHEDULED
Qty: 20 TABLET | Refills: 0 | Status: SHIPPED | OUTPATIENT
Start: 2024-04-08 | End: 2024-04-18

## 2024-04-08 RX ADMIN — IOHEXOL 100 ML: 350 INJECTION, SOLUTION INTRAVENOUS at 13:47

## 2024-04-08 NOTE — RESULT ENCOUNTER NOTE
Findings consistent with possible acute uncomplicated diverticulitis;  Lets start Augmentin  He can follow with gastro

## 2024-04-08 NOTE — LETTER
April 8, 2024     Patient: Tom Sanchez  YOB: 1990  Date of Visit: 4/8/2024      To Whom it May Concern:    Tom Sanchez is under my professional care. Tom was seen in my office on 4/8/2024. Tom may return to work on 4/10/24 .    If you have any questions or concerns, please don't hesitate to call.         Sincerely,          Charito Lutz MD        CC: No Recipients

## 2024-04-08 NOTE — PROGRESS NOTES
"Assessment/Plan:      LLQ pain that started on Saturday, feels like a \"bad cramp\", he denies n/v/d/c, his last BM was yesterday, denies dysuria; has been drinking and eating normally; denies any precipitating event, has been more stressed than usual; denies h/o kidney stones;    He appears very uncomfortable on exam, extremely tender on exam to LLQ; drinks 1-2 alcoholic beverages a day;     Anxiety is worse, discussed medication and talk therapy.      Quality Measures:       Depression Screening and Follow-up Plan: Patient was screened for depression during today's encounter. They screened negative with a PHQ-2 score of 0.    Tobacco Cessation Counseling: Tobacco cessation counseling was provided. The patient is sincerely urged to quit consumption of tobacco. He is not ready to quit tobacco.          Return in about 1 month (around 5/8/2024) for Annual physical.    No problem-specific Assessment & Plan notes found for this encounter.       Diagnoses and all orders for this visit:    Left lower quadrant abdominal pain  -     CT abdomen pelvis w contrast; Future    Anxiety          Subjective:      Patient ID: Tom Sanchez is a 34 y.o. male.    Abdominal Pain  This is a new problem. The current episode started yesterday. The onset quality is sudden. The problem occurs constantly. The most recent episode lasted 2 days. The problem has been gradually worsening. The pain is located in the LLQ. The pain is at a severity of 7/10. The quality of the pain is aching. The abdominal pain radiates to the LLQ. Associated symptoms include belching, flatus and myalgias. Pertinent negatives include no anorexia, arthralgias, constipation, diarrhea, dysuria, fever, frequency, headaches, hematochezia, hematuria, melena, nausea, vomiting or weight loss. The pain is aggravated by deep breathing and movement. The pain is relieved by Being still, recumbency and sitting up.       ALLERGIES:  No Known Allergies    CURRENT " MEDICATIONS:    Current Outpatient Medications:     fluocinolone acetonide (DERMOTIC) 0.01 % otic oil, Please place 4 drops into each ear as needed for ear itching., Disp: 20 mL, Rfl: 0    omeprazole (PriLOSEC) 20 mg delayed release capsule, Take 1 capsule (20 mg total) by mouth daily before breakfast, Disp: 30 capsule, Rfl: 0    ACTIVE PROBLEM LIST:  Patient Active Problem List   Diagnosis    Gastroesophageal reflux disease with esophagitis without hemorrhage       PAST MEDICAL HISTORY:  History reviewed. No pertinent past medical history.    PAST SURGICAL HISTORY:  History reviewed. No pertinent surgical history.    FAMILY HISTORY:  History reviewed. No pertinent family history.    SOCIAL HISTORY:  Social History     Socioeconomic History    Marital status: /Civil Union     Spouse name: Not on file    Number of children: Not on file    Years of education: Not on file    Highest education level: Not on file   Occupational History    Not on file   Tobacco Use    Smoking status: Some Days     Types: Cigarettes    Smokeless tobacco: Current     Types: Chew   Vaping Use    Vaping status: Former   Substance and Sexual Activity    Alcohol use: Yes     Comment: socially    Drug use: Not Currently     Types: Marijuana     Comment: once a week.     Sexual activity: Not on file   Other Topics Concern    Not on file   Social History Narrative    Not on file     Social Determinants of Health     Financial Resource Strain: Not on file   Food Insecurity: Not on file   Transportation Needs: Not on file   Physical Activity: Not on file   Stress: Not on file   Social Connections: Not on file   Intimate Partner Violence: Not on file   Housing Stability: Not on file       Review of Systems   Constitutional:  Negative for chills, fever and weight loss.   HENT:  Negative for ear pain and sore throat.    Eyes:  Negative for pain and visual disturbance.   Respiratory:  Negative for cough and shortness of breath.    Cardiovascular:  " Negative for chest pain and palpitations.   Gastrointestinal:  Positive for abdominal pain and flatus. Negative for anorexia, constipation, diarrhea, hematochezia, melena, nausea and vomiting.   Genitourinary:  Negative for dysuria, frequency and hematuria.   Musculoskeletal:  Positive for myalgias. Negative for arthralgias and back pain.   Skin:  Negative for color change and rash.   Neurological:  Negative for seizures, syncope and headaches.   All other systems reviewed and are negative.        Objective:  Vitals:    04/08/24 1020   BP: 122/76   BP Location: Right arm   Patient Position: Sitting   Cuff Size: Adult   Pulse: 70   Temp: 98 °F (36.7 °C)   TempSrc: Tympanic   SpO2: 97%   Weight: 105 kg (232 lb)   Height: 5' 8.5\" (1.74 m)     Body mass index is 34.76 kg/m².     Physical Exam  Vitals and nursing note reviewed.   Constitutional:       Appearance: Normal appearance. He is obese.   HENT:      Head: Normocephalic and atraumatic.   Cardiovascular:      Rate and Rhythm: Normal rate.   Pulmonary:      Effort: Pulmonary effort is normal.   Abdominal:      General: Abdomen is flat. Bowel sounds are normal.      Palpations: Abdomen is soft.      Tenderness: There is abdominal tenderness in the left lower quadrant. There is guarding.      Hernia: No hernia is present.      Comments: No CV tenderness b/l   Musculoskeletal:         General: Normal range of motion.      Cervical back: Normal range of motion.   Skin:     General: Skin is warm and dry.   Neurological:      General: No focal deficit present.      Mental Status: He is alert and oriented to person, place, and time. Mental status is at baseline.   Psychiatric:         Mood and Affect: Mood normal.           RESULTS:  Hemoglobin A1C   Date/Time Value Ref Range Status   12/04/2021 10:50 AM 5.2 Normal 3.8-5.6%; PreDiabetic 5.7-6.4%; Diabetic >=6.5%; Glycemic control for adults with diabetes <7.0% % Final     Cholesterol   Date/Time Value Ref Range Status "   12/04/2021 10:50  See Comment mg/dL Final     Comment:     Cholesterol:         Pediatric <18 Years        Desirable          <170 mg/dL      Borderline High    170-199 mg/dL      High               >=200 mg/dL        Adult >=18 Years            Desirable        <200 mg/dL      Borderline High  200-239 mg/dL      High             >239 mg/dL       Triglycerides   Date/Time Value Ref Range Status   12/04/2021 10:50 AM 77 See Comment mg/dL Final     Comment:     Triglyceride:     0-9Y            <75mg/dL     10Y-17Y         <90 mg/dL       >=18Y     Normal          <150 mg/dL     Borderline High 150-199 mg/dL     High            200-499 mg/dL        Very High       >499 mg/dL    Specimen collection should occur prior to N-Acetylcysteine or Metamizole administration due to the potential for falsely depressed results.     HDL, Direct   Date/Time Value Ref Range Status   12/04/2021 10:50 AM 35 (L) >=40 mg/dL Final     Comment:     Specimen collection should occur prior to Metamizole administration due to the potential for falsley depressed results.     LDL Calculated   Date/Time Value Ref Range Status   12/04/2021 10:50  (H) 0 - 100 mg/dL Final     Comment:     LDL Cholesterol:     Optimal           <100 mg/dl     Near Optimal      100-129 mg/dl     Above Optimal       Borderline High 130-159 mg/dl       High            160-189 mg/dl       Very High       >189 mg/dl         This screening LDL is a calculated result.   It does not have the accuracy of the Direct Measured LDL in the monitoring of patients with hyperlipidemia and/or statin therapy.   Direct Measure LDL (GLS760) must be ordered separately in these patients.     Hemoglobin   Date/Time Value Ref Range Status   12/04/2021 10:50 AM 16.1 12.0 - 17.0 g/dL Final     Hematocrit   Date/Time Value Ref Range Status   12/04/2021 10:50 AM 47.7 36.5 - 49.3 % Final     Platelets   Date/Time Value Ref Range Status   12/04/2021 10:50  149 - 390  Thousands/uL Final     TSH 3RD GENERATON   Date/Time Value Ref Range Status   12/04/2021 10:50 AM 0.716 0.358 - 3.740 uIU/mL Final     Sodium   Date/Time Value Ref Range Status   12/04/2021 10:50  136 - 145 mmol/L Final     BUN   Date/Time Value Ref Range Status   12/04/2021 10:50 AM 14 5 - 25 mg/dL Final     Creatinine   Date/Time Value Ref Range Status   12/04/2021 10:50 AM 1.02 0.60 - 1.30 mg/dL Final     Comment:     Standardized to IDMS reference method      In chart    This note was created with voice recognition software.  Phonic, grammatical and spelling errors may be present within the note as a result.

## 2024-04-09 ENCOUNTER — TELEPHONE (OUTPATIENT)
Dept: INTERNAL MEDICINE CLINIC | Facility: CLINIC | Age: 34
End: 2024-04-09

## 2024-04-09 NOTE — TELEPHONE ENCOUNTER
Patient was informed, he stated he has an appointment with GI on 4/10 and picked up his antibiotic today and will start taking it... letter was sent covering him up until today I mentioned if he needed another letter for his employer [employer] might be able to provide that for him, he understood.

## 2024-04-09 NOTE — TELEPHONE ENCOUNTER
Patient hasn't started Augmentin yet. They will  today. He didn't receive message that it was called in. He is still having a lot of discomfort. He is suppose to go back to work tomorrow. If needed, could another note be done to keep him out longer? Noemi, wife, states he is suppose to start a new job on Monday, 04/15. Is this something that when he does start medication he will start to feel better quickly or does it take some time? Also, is this something that he needs to see gastro for immediately or can it wait?    Please advise...    Call back #[phone]  Noemi/spouse

## 2024-04-10 ENCOUNTER — PREP FOR PROCEDURE (OUTPATIENT)
Age: 34
End: 2024-04-10

## 2024-04-10 ENCOUNTER — OFFICE VISIT (OUTPATIENT)
Age: 34
End: 2024-04-10
Payer: COMMERCIAL

## 2024-04-10 VITALS
DIASTOLIC BLOOD PRESSURE: 70 MMHG | OXYGEN SATURATION: 98 % | HEART RATE: 81 BPM | BODY MASS INDEX: 34.56 KG/M2 | HEIGHT: 68 IN | SYSTOLIC BLOOD PRESSURE: 108 MMHG | WEIGHT: 228 LBS

## 2024-04-10 DIAGNOSIS — K57.92 DIVERTICULITIS: Primary | ICD-10-CM

## 2024-04-10 PROCEDURE — 99204 OFFICE O/P NEW MOD 45 MIN: CPT | Performed by: PHYSICIAN ASSISTANT

## 2024-04-10 RX ORDER — DICYCLOMINE HCL 20 MG
20 TABLET ORAL EVERY 8 HOURS PRN
Qty: 30 TABLET | Refills: 0 | Status: SHIPPED | OUTPATIENT
Start: 2024-04-10 | End: 2024-07-09

## 2024-04-10 NOTE — LETTER
April 10, 2024     Charito Lutz MD  [address]  [address] [address]    Patient: Tom Sanchez   YOB: 1990   Date of Visit: 4/10/2024       To Whom This May Concern :    Tom Sanchez is currently under my professional care due to gastrointestinal issues. Please excuse him from work today April 10th and tomorrow April 11th 2024. He may return back on Friday April 12th. If you have any questions or concerns in regards to this matter please don't hesitate to give my office a call at [phone].     Sincerely,            Indigo Pastrana PA-C

## 2024-04-10 NOTE — PATIENT INSTRUCTIONS
Scheduled date of colonoscopy (as of today): 6/6/24  Physician performing colonoscopy: Yasir  Location of colonoscopy: [facility]  Bowel prep reviewed with patient: Miralax  Instructions reviewed with patient by: Ines SALES  Clearances:

## 2024-04-10 NOTE — PROGRESS NOTES
[facility] - Outpatient Consultation  Tom Sanchez 34 y.o. male MRN: [id number]  Encounter: [id number]          ASSESSMENT AND PLAN:      1. Diverticulitis    Patient developed LLQ abdominal pain over the weekend.  His PCP ordered a CT Scan A/P which showed an underdistended descending colon with minimal focal pericolonic fat stranding which may represent a colitis vs a subtle acute uncomplicated diverticulitis.  He was started on Augmentin yesterday by his PCP.    Recommend liquid diet x 2 days followed by a low residue diet while on the antibiotic course.  Continue the Augmentin course x 10 days.  Bentyl 20mg po TID prn abdominal pain.  After a couple of weeks, then recommend to transition to a high fiber diet.  Will plan for colonoscopy in 8 weeks after resolution of the acute episode.  ______________________________________________________________________    HPI:  Patient is a pleasant 34 year old male who presents to the office for a gastrointestinal evaluation.  Patient reports he developed LLQ abdominal pain over the weekend.  His PCP ordered a CT Scan A/P which showed an underdistended descending colon with minimal focal pericolonic fat stranding which may represent colitis or subtle acute uncomplicated diverticulitis. He was started on Augmentin yesterday by his PCP. No diarrhea.  No fevers.  He has a history of scant bleeding on the toilet tissue in the past which he attributes to hemorrhoids.  No chronic abdominal pain issues.  No prior history of diverticulitis.  No family history of colon cancer.  He reports his mother has a history of diverticulitis.      REVIEW OF SYSTEMS:    CONSTITUTIONAL: Denies any fever, chills, rigors, and weight loss.  HEENT: No earache or tinnitus. Denies hearing loss or visual disturbances.  CARDIOVASCULAR: No chest pain or palpitations.   RESPIRATORY: Denies any cough, hemoptysis, shortness of breath or dyspnea on exertion.  GASTROINTESTINAL: As  "noted in the History of Present Illness.   GENITOURINARY: No problems with urination. Denies any hematuria or dysuria.  NEUROLOGIC: No dizziness or vertigo, denies headaches.   MUSCULOSKELETAL: Denies any muscle or joint pain.   SKIN: Denies skin rashes or itching.   ENDOCRINE: Denies excessive thirst. Denies intolerance to heat or cold.  PSYCHOSOCIAL: Denies depression or anxiety. Denies any recent memory loss.       Historical Information   Past Medical History:   Diagnosis Date    Diverticulitis of colon     GERD (gastroesophageal reflux disease)      History reviewed. No pertinent surgical history.  Social History   Social History     Substance and Sexual Activity   Alcohol Use Yes    Comment: socially     Social History     Substance and Sexual Activity   Drug Use Yes    Types: Marijuana    Comment: once a week.      Social History     Tobacco Use   Smoking Status Former    Types: Cigarettes   Smokeless Tobacco Current    Types: Chew     History reviewed. No pertinent family history.    Meds/Allergies       Current Outpatient Medications:     amoxicillin-clavulanate (AUGMENTIN) 875-125 mg per tablet    dicyclomine (BENTYL) 20 mg tablet    fluocinolone acetonide (DERMOTIC) 0.01 % otic oil    omeprazole (PriLOSEC) 20 mg delayed release capsule    No Known Allergies        Objective     Blood pressure 108/70, pulse 81, height 5' 8\" (1.727 m), weight 103 kg (228 lb), SpO2 98%. Body mass index is 34.67 kg/m².        PHYSICAL EXAM:      General Appearance:   Alert, cooperative, no distress   HEENT:   Normocephalic, atraumatic, anicteric    Neck:  Supple, symmetrical, trachea midline   Lungs:   Clear to auscultation bilaterally; no rales, rhonchi or wheezing; respirations unlabored    Heart::   Regular rate and rhythm; no murmur, rub, or gallop.   Abdomen:   Soft, non-tender, non-distended; normal bowel sounds; no masses, no organomegaly    Genitalia:   Deferred    Rectal:   Deferred    Extremities:  No cyanosis, " clubbing or edema    Pulses:  2+ and symmetric    Skin:  No jaundice, rashes, or lesions    Lymph nodes:  No palpable cervical lymphadenopathy        Lab Results:   No visits with results within 1 Day(s) from this visit.   Latest known visit with results is:   Office Visit on 11/19/2023   Component Date Value     RAPID STREP A 11/19/2023 Negative          Radiology Results:   CT abdomen pelvis w contrast    Result Date: 4/8/2024  Narrative: CT ABDOMEN AND PELVIS WITH IV CONTRAST INDICATION: R10.32: Left lower quadrant pain. COMPARISON: None. TECHNIQUE: CT examination of the abdomen and pelvis was performed. Multiplanar 2D reformatted images were created from the source data. This examination, like all CT scans performed in the [facility], was performed utilizing techniques to minimize radiation dose exposure, including the use of iterative reconstruction and automated exposure control. Radiation dose length product (DLP) for this visit: 564 mGy-cm IV Contrast: 100 mL of iohexol (OMNIPAQUE) Enteric Contrast: Not administered. FINDINGS: ABDOMEN LOWER CHEST: No clinically significant abnormality in the visualized lower chest. LIVER/BILIARY TREE: Focal fatty infiltration at the insertion site of falciform ligament.. GALLBLADDER: No calcified gallstones. No pericholecystic inflammatory change. SPLEEN: Subcentimeter low-attenuation splenic lesion (2/35), too small to characterize.. PANCREAS: Unremarkable. ADRENAL GLANDS: Unremarkable. KIDNEYS/URETERS: Unremarkable. No hydronephrosis. STOMACH AND BOWEL: No colonic diverticulosis. Underdistended descending colon, limiting evaluation. Faint minimal pericolonic fat stranding around the descending colon (series 2 images 112-120) APPENDIX: Normal. ABDOMINOPELVIC CAVITY: No ascites. No pneumoperitoneum. No lymphadenopathy. VESSELS: Unremarkable for patient's age. PELVIS REPRODUCTIVE ORGANS: Unremarkable for patient's age. URINARY BLADDER: Underdistended,  limiting evaluation ABDOMINAL WALL/INGUINAL REGIONS: Unremarkable. BONES: No acute fracture or suspicious osseous lesion.     Impression: No colonic diverticulosis. Underdistended descending colon with minimal focal pericolonic fat stranding, which may represent colitis or subtle acute uncomplicated diverticulitis in appropriate clinical settings. The study was marked in EPIC for immediate notification. Workstation performed: [id number]

## 2024-05-18 ENCOUNTER — OFFICE VISIT (OUTPATIENT)
Age: 34
End: 2024-05-18
Payer: COMMERCIAL

## 2024-05-18 VITALS
DIASTOLIC BLOOD PRESSURE: 72 MMHG | BODY MASS INDEX: 33.45 KG/M2 | RESPIRATION RATE: 18 BRPM | WEIGHT: 220 LBS | HEART RATE: 69 BPM | SYSTOLIC BLOOD PRESSURE: 115 MMHG | TEMPERATURE: 97.1 F | OXYGEN SATURATION: 97 %

## 2024-05-18 DIAGNOSIS — J06.9 VIRAL UPPER RESPIRATORY ILLNESS: Primary | ICD-10-CM

## 2024-05-18 DIAGNOSIS — J02.9 SORE THROAT: ICD-10-CM

## 2024-05-18 PROCEDURE — 87880 STREP A ASSAY W/OPTIC: CPT | Performed by: PHYSICIAN ASSISTANT

## 2024-05-18 PROCEDURE — 87070 CULTURE OTHR SPECIMN AEROBIC: CPT | Performed by: PHYSICIAN ASSISTANT

## 2024-05-18 PROCEDURE — 99213 OFFICE O/P EST LOW 20 MIN: CPT | Performed by: PHYSICIAN ASSISTANT

## 2024-05-18 NOTE — LETTER
May 18, 2024     Patient: Tom Sanchez   YOB: 1990   Date of Visit: 5/18/2024       To Whom It May Concern:    It is my medical opinion that Tom Sanchez should be excused from work on 5/17/2024 May return to work on 5/20/2024.    If you have any questions or concerns, please don't hesitate to call.         Sincerely,        Avel Patino PA-C    CC: No Recipients

## 2024-05-19 LAB — BACTERIA THROAT CULT: NORMAL

## 2024-05-20 LAB — BACTERIA THROAT CULT: NORMAL

## 2024-05-22 ENCOUNTER — AMB VIDEO VISIT (OUTPATIENT)
Dept: OTHER | Facility: HOSPITAL | Age: 34
End: 2024-05-22

## 2024-05-22 DIAGNOSIS — J40 BRONCHITIS: Primary | ICD-10-CM

## 2024-05-22 PROCEDURE — ECARE PR SL URGENT CARE VIRTUAL VISIT: Performed by: PHYSICIAN ASSISTANT

## 2024-05-22 RX ORDER — BENZONATATE 100 MG/1
100 CAPSULE ORAL 3 TIMES DAILY PRN
Qty: 20 CAPSULE | Refills: 0 | Status: SHIPPED | OUTPATIENT
Start: 2024-05-22

## 2024-05-22 RX ORDER — AZITHROMYCIN 250 MG/1
TABLET, FILM COATED ORAL
Qty: 6 TABLET | Refills: 0 | Status: SHIPPED | OUTPATIENT
Start: 2024-05-22 | End: 2024-05-27

## 2024-05-22 NOTE — CARE ANYWHERE EVISITS
Visit Summary for BETH ECHOLS - Gender: Male - Date of Birth: 1990  Date: [id number] - Duration: 4 minutes  Patient: BETH ECHOLS  Provider: Fabiola Ludwig PA-C    Patient Contact Information  Address  [address]  [place]; [place] [address]  [phone]    Visit Topics  Flu-Like Symptoms [Added By: Self - 2024-05-22]  Cold [Added By: Self - 2024-05-22]  Sore throat [Added By: Self - 2024-05-22]    Triage Questions   What is your current physical address in the event of a medical emergency? Answer []  Are you allergic to any medications? Answer []  Are you now or could you be pregnant? Answer []  Do you have any immune system compromise or chronic lung   disease? Answer []  Do you have any vulnerable family members in the home (infant, pregnant, cancer, elderly)? Answer []     Conversation Transcripts  [0A][0A] [Notification] You are connected with Fabiola Ludwig PA-C, Urgent Care Specialist.[0A][Notification] BETH ECHOLS is located in [place].[0A][Notification] BETH ECHOLS has shared health history...[0A]    Diagnosis  Bronchitis, not specified as acute or chronic    Procedures  Value: [id number] Code: CPT-4 UNLISTED E&M SERVICE    Medications Prescribed    No prescriptions ordered    Electronically signed by: Fabiola Ludwig PA-C(NPI [id number])

## 2024-10-24 ENCOUNTER — OFFICE VISIT (OUTPATIENT)
Age: 34
End: 2024-10-24
Payer: COMMERCIAL

## 2024-10-24 VITALS
SYSTOLIC BLOOD PRESSURE: 116 MMHG | WEIGHT: 220 LBS | RESPIRATION RATE: 18 BRPM | BODY MASS INDEX: 33.45 KG/M2 | HEART RATE: 71 BPM | OXYGEN SATURATION: 95 % | DIASTOLIC BLOOD PRESSURE: 82 MMHG | TEMPERATURE: 97.6 F

## 2024-10-24 DIAGNOSIS — J01.90 ACUTE SINUSITIS, RECURRENCE NOT SPECIFIED, UNSPECIFIED LOCATION: Primary | ICD-10-CM

## 2024-10-24 PROCEDURE — 99213 OFFICE O/P EST LOW 20 MIN: CPT | Performed by: PHYSICIAN ASSISTANT

## 2024-10-24 RX ORDER — METHYLPREDNISOLONE 4 MG/1
TABLET ORAL
Qty: 1 EACH | Refills: 0 | Status: SHIPPED | OUTPATIENT
Start: 2024-10-24

## 2024-10-24 NOTE — LETTER
October 24, 2024     Patient: Tom Sanchez   YOB: 1990   Date of Visit: 10/24/2024       To Whom It May Concern:    It is my medical opinion that Tom Sanchez may return to work on 10/25/24 . Please excuse from any work missed due to current illness    If you have any questions or concerns, please don't hesitate to call.         Sincerely,        Avel Patino PA-C    CC: No Recipients

## 2024-10-24 NOTE — LETTER
October 24, 2024     Patient: Tom Sanchez   YOB: 1990   Date of Visit: 10/24/2024       To Whom It May Concern:    It is my medical opinion that Tom Sanchez may return to work on 10/25/24 .    If you have any questions or concerns, please don't hesitate to call.         Sincerely,        Avel Patino PA-C    CC: No Recipients

## 2024-10-24 NOTE — PROGRESS NOTES
[facility]        NAME: Tom Sanchez is a 34 y.o. male  : 1990    MRN: [id number]  DATE: 2024  TIME: 2:57 PM    Assessment and Plan   Acute sinusitis, recurrence not specified, unspecified location [J01.90]  1. Acute sinusitis, recurrence not specified, unspecified location  amoxicillin-clavulanate (AUGMENTIN) 875-125 mg per tablet    methylPREDNISolone 4 MG tablet therapy pack          Patient appears to have acute sinusitis based on postnasal drip congestion did have a slight improvement initially but then symptoms began worsening again.  May also have beginnings of bronchitis.  Will treat him with Augmentin and Medrol Dosepak.  We discussed possible COVID flu testing with the patient discussed that based on his medical history this would not  whether the results were positive or negative patient understood verbalized agreement.    The patient and/or parent/legal guardian verbalized understanding of exam findings and   Treatment plan. We engaged in the shared decision-making process and treatment options were   discussed at length with the patient.  All questions, concerns and complaints were answered and   addressed to the patient's' and/or parent/legal guardians's satisfaction.    Patient Instructions   There are no Patient Instructions on file for this visit.    Follow up with PCP in 3-5 days.  Proceed to  ER if symptoms worsen.    If tests are performed, our office will contact you with results only if   changes need to made to the care plan discussed with you at the visit.   [facility]     Chief Complaint     Chief Complaint   Patient presents with    Cough     Post-nasal drip, productive cough X 4 days         History of Present Illness       HPI  Patient complains of postnasal drip and adductive cough for last 4 days.. Pt has sinus pressure and congestion as well. Returned from [place] recently. Had sore throat as  well. [place] felt better then Sunday night got worse started taking mucinex day/night time. No chest pain or POND. Does use e-cigs.     Review of Systems   Review of Systems  All other related systems reviewed and are negative except as noted in HPI    Current Medications       Current Outpatient Medications:     amoxicillin-clavulanate (AUGMENTIN) 875-125 mg per tablet, Take 1 tablet by mouth every 12 (twelve) hours for 10 days, Disp: 20 tablet, Rfl: 0    methylPREDNISolone 4 MG tablet therapy pack, Use as directed on package, Disp: 1 each, Rfl: 0    omeprazole (PriLOSEC) 20 mg delayed release capsule, Take 1 capsule (20 mg total) by mouth daily before breakfast, Disp: 30 capsule, Rfl: 0    benzonatate (TESSALON PERLES) 100 mg capsule, Take 1 capsule (100 mg total) by mouth 3 (three) times a day as needed for cough (Patient not taking: Reported on 10/24/2024), Disp: 20 capsule, Rfl: 0    dicyclomine (BENTYL) 20 mg tablet, Take 1 tablet (20 mg total) by mouth every 8 (eight) hours as needed (abdominal pain) (Patient not taking: Reported on 5/18/2024), Disp: 30 tablet, Rfl: 0    fluocinolone acetonide (DERMOTIC) 0.01 % otic oil, Please place 4 drops into each ear as needed for ear itching. (Patient not taking: Reported on 10/24/2024), Disp: 20 mL, Rfl: 0    Current Allergies     Allergies as of 10/24/2024    (No Known Allergies)            The following portions of the patient's history were reviewed and updated as appropriate: allergies, current medications, past family history, past medical history, past social history, past surgical history and problem list.     Past Medical History:   Diagnosis Date    Diverticulitis of colon     GERD (gastroesophageal reflux disease)        No past surgical history on file.    No family history on file.      Medications have been verified.        Objective   /82 (BP Location: Left arm, Patient Position: Sitting, Cuff Size: Adult)   Pulse 71   Temp  "97.6 °F (36.4 °C) (Tympanic)   Resp 18   Wt 99.8 kg (220 lb)   SpO2 95%   BMI 33.45 kg/m²   No LMP for male patient.       Physical Exam     Physical Exam  Constitutional:       General: He is not in acute distress.     Appearance: He is well-developed.   HENT:      Head: Normocephalic and atraumatic.      Mouth/Throat:      Mouth: Mucous membranes are moist.      Pharynx: No posterior oropharyngeal erythema.   Eyes:      General: No scleral icterus.        Right eye: No discharge.         Left eye: No discharge.      Extraocular Movements: Extraocular movements intact.      Conjunctiva/sclera: Conjunctivae normal.      Pupils: Pupils are equal, round, and reactive to light.   Cardiovascular:      Rate and Rhythm: Normal rate and regular rhythm.      Heart sounds: Normal heart sounds. No murmur heard.  Pulmonary:      Effort: Pulmonary effort is normal. No respiratory distress.      Breath sounds: No stridor. Rhonchi present. No wheezing or rales.   Musculoskeletal:      Cervical back: Normal range of motion and neck supple.   Skin:     General: Skin is warm and dry.   Neurological:      Mental Status: He is alert and oriented to person, place, and time.   Psychiatric:         Behavior: Behavior normal.         Ortho Exam        Procedures  No Procedures performed today        Note: Portions of this record may have been created with voice recognition software. Occasional wrong word or \"sound a like\" substitutions may have occurred due to the inherent limitations of voice recognition software. Please read the chart carefully and recognize, using context, where substitutions have occurred.*      "

## 2024-11-18 ENCOUNTER — OFFICE VISIT (OUTPATIENT)
Age: 34
End: 2024-11-18
Payer: COMMERCIAL

## 2024-11-18 VITALS
RESPIRATION RATE: 18 BRPM | TEMPERATURE: 98.5 F | DIASTOLIC BLOOD PRESSURE: 71 MMHG | BODY MASS INDEX: 34.73 KG/M2 | HEART RATE: 69 BPM | WEIGHT: 228.4 LBS | OXYGEN SATURATION: 96 % | SYSTOLIC BLOOD PRESSURE: 122 MMHG

## 2024-11-18 DIAGNOSIS — J02.9 SORE THROAT: Primary | ICD-10-CM

## 2024-11-18 LAB — S PYO AG THROAT QL: NEGATIVE

## 2024-11-18 PROCEDURE — 87070 CULTURE OTHR SPECIMN AEROBIC: CPT | Performed by: PHYSICIAN ASSISTANT

## 2024-11-18 PROCEDURE — 87880 STREP A ASSAY W/OPTIC: CPT | Performed by: PHYSICIAN ASSISTANT

## 2024-11-18 PROCEDURE — 99213 OFFICE O/P EST LOW 20 MIN: CPT | Performed by: PHYSICIAN ASSISTANT

## 2024-11-18 NOTE — PROGRESS NOTES
[facility]        NAME: Tom Sanchez is a 34 y.o. male  : 1990    MRN: [id number]  DATE: 2024  TIME: 3:15 PM    Assessment and Plan   Sore throat [J02.9]  1. Sore throat  POCT rapid strepA    Throat culture            Patient Instructions       Your rapid strep was negative however we will send the throat culture to confirm and if this is positive we will contact you to discuss a new treatment plan.  This is viral pharyngitis and so an antibiotic is not required.   Warm water gargles with salt 3 times daily.  E-Z throat lozenges.  Tylenol or Advil for pain and discomfort  Stay well-hydrated.       Follow up with PCP in 3-5 days.  Proceed to  ER if symptoms worsen.    If tests are performed, our office will contact you with results only if changes need to made to the care plan discussed with you at the visit. [facility]    Chief Complaint     Chief Complaint   Patient presents with    Sore Throat     Symptoms started this morning.          History of Present Illness       Sore Throat   This is a new problem. The current episode started today. The problem has been rapidly worsening. There has been no fever. Pertinent negatives include no congestion, coughing, diarrhea, drooling, ear discharge, headaches, neck pain, shortness of breath, stridor, swollen glands, trouble swallowing or vomiting. He has had no exposure to strep or mono. He has tried nothing for the symptoms. The treatment provided no relief.       Review of Systems   Review of Systems   Constitutional:  Negative for activity change, appetite change, chills, fatigue and fever.   HENT:  Positive for postnasal drip and sore throat. Negative for congestion, drooling, ear discharge and trouble swallowing.    Respiratory:  Negative for cough, shortness of breath and stridor.    Gastrointestinal:  Negative for diarrhea and vomiting.   Musculoskeletal:  Negative for neck pain.   Skin:   Negative for rash.   Neurological:  Negative for headaches.         Current Medications       Current Outpatient Medications:     omeprazole (PriLOSEC) 20 mg delayed release capsule, Take 1 capsule (20 mg total) by mouth daily before breakfast, Disp: 30 capsule, Rfl: 0    benzonatate (TESSALON PERLES) 100 mg capsule, Take 1 capsule (100 mg total) by mouth 3 (three) times a day as needed for cough (Patient not taking: Reported on 10/24/2024), Disp: 20 capsule, Rfl: 0    dicyclomine (BENTYL) 20 mg tablet, Take 1 tablet (20 mg total) by mouth every 8 (eight) hours as needed (abdominal pain) (Patient not taking: Reported on 5/18/2024), Disp: 30 tablet, Rfl: 0    fluocinolone acetonide (DERMOTIC) 0.01 % otic oil, Please place 4 drops into each ear as needed for ear itching. (Patient not taking: Reported on 10/24/2024), Disp: 20 mL, Rfl: 0    methylPREDNISolone 4 MG tablet therapy pack, Use as directed on package (Patient not taking: Reported on 11/18/2024), Disp: 1 each, Rfl: 0    Current Allergies     Allergies as of 11/18/2024    (No Known Allergies)            The following portions of the patient's history were reviewed and updated as appropriate: allergies, current medications, past family history, past medical history, past social history, past surgical history and problem list.     Past Medical History:   Diagnosis Date    Diverticulitis of colon     GERD (gastroesophageal reflux disease)        History reviewed. No pertinent surgical history.    History reviewed. No pertinent family history.      Medications have been verified.        Objective   /71   Pulse 69   Temp 98.5 °F (36.9 °C)   Resp 18   Wt 104 kg (228 lb 6.4 oz)   SpO2 96%   BMI 34.73 kg/m²        Physical Exam     Physical Exam  Vitals and nursing note reviewed.   Constitutional:       General: He is not in acute distress.     Appearance: Normal appearance. He is well-developed and normal weight. He is not ill-appearing, toxic-appearing or  diaphoretic.   HENT:      Head: Normocephalic and atraumatic.      Right Ear: Tympanic membrane, ear canal and external ear normal.      Left Ear: Tympanic membrane, ear canal and external ear normal.      Nose: Congestion and rhinorrhea present.      Mouth/Throat:      Mouth: Mucous membranes are moist. Oral lesions present.      Pharynx: Posterior oropharyngeal erythema present. No pharyngeal swelling, oropharyngeal exudate or uvula swelling.      Tonsils: No tonsillar exudate or tonsillar abscesses. 0 on the right. 0 on the left.   Eyes:      General: No scleral icterus.     Extraocular Movements: Extraocular movements intact.      Right eye: Normal extraocular motion.      Left eye: Normal extraocular motion.      Conjunctiva/sclera: Conjunctivae normal.      Pupils: Pupils are equal, round, and reactive to light.   Neck:      Thyroid: No thyromegaly.   Cardiovascular:      Rate and Rhythm: Normal rate and regular rhythm.      Pulses: Normal pulses.      Heart sounds: Normal heart sounds.   Pulmonary:      Effort: Pulmonary effort is normal. No respiratory distress.      Breath sounds: Normal breath sounds. No wheezing or rhonchi.   Abdominal:      General: Abdomen is flat. Bowel sounds are normal.      Palpations: Abdomen is soft. There is no mass.      Tenderness: There is no abdominal tenderness. There is no guarding or rebound.   Musculoskeletal:         General: Normal range of motion.      Cervical back: Normal range of motion and neck supple. No tenderness.   Lymphadenopathy:      Cervical: No cervical adenopathy.   Skin:     General: Skin is warm and dry.      Capillary Refill: Capillary refill takes less than 2 seconds.      Findings: No rash.   Neurological:      General: No focal deficit present.      Mental Status: He is alert and oriented to person, place, and time.   Psychiatric:         Mood and Affect: Mood normal.         Behavior: Behavior normal.         Thought Content: Thought content  normal.         Judgment: Judgment normal.

## 2024-11-18 NOTE — LETTER
November 18, 2024     Patient: Tom Sanchez   YOB: 1990   Date of Visit: 11/18/2024       To Whom it May Concern:    Tom Sanchez was seen in my clinic on 11/18/2024. He may return to work on 11/19/2024 .    If you have any questions or concerns, please don't hesitate to call.         Sincerely,          Himanshu Carranza PA-C        CC: No Recipients

## 2024-11-18 NOTE — PATIENT INSTRUCTIONS
Patient Education     Sore throat in adults   The Basics   Written by the doctors and editors at [facility]   What causes sore throat? -- Sore throat is usually caused by an infection. Two types of germs can cause it: viruses and bacteria.  People who have a sore throat caused by a virus do not usually need to see a doctor or nurse. But if you think that you might have been exposed to COVID-19, or if COVID-19 is common where you live, ask your doctor or nurse if you should be tested.  People who have a sore throat caused by bacteria might need to see a doctor or nurse. They might have a type of infection called strep throat. Only about 1 in 10 adults who seek medical care for sore throat have strep throat.  How can I tell if my sore throat is caused by a virus or strep throat? -- It is hard to tell the difference. But there are some clues to look for.  People who have a sore throat caused by a virus usually have other symptoms, such as:   Stuffy or runny nose   Itchy or red eyes   Cough  People who have COVID-19 can have a sore throat as their only symptom. Or they can have other symptoms such as fever, cough, trouble breathing, and problems with their sense of smell or taste. In most cases, the only way to know for sure if you have COVID-19 is to get tested.  People who have strep throat do not usually have a cough, runny nose, or itchy or red eyes. They might have:   Severe throat pain   Fever (temperature higher than 100.4°F or 38°C)   Swollen glands in the neck  If you think that you have strep throat, the doctor or nurse can easily check. They can take a sample from the back of your throat and test it for the bacteria that cause strep throat.  Do I need antibiotics? -- If you have an infection caused by a virus, you do not need antibiotics. But if you have strep throat, you should get antibiotics. Most people with strep throat get better without antibiotics, but doctors and nurses often prescribe them. This is  "because antibiotics often can prevent other problems that might be caused by strep throat. Plus, antibiotics can reduce the symptoms of strep throat and prevent you from spreading it to other people.  What can I do to feel better? -- To relieve the pain of sore throat, you can:   Take over-the-counter pain medicine - Acetaminophen (sample brand name: Tylenol) or ibuprofen (sample brand names: Advil, Motrin) can help with throat pain.   Use sore throat lozenges or sprays - Using medicated sore throat lozenges or throat sprays can temporarily reduce throat pain.   Suck on hard candies, ice chips, or ice pops.   Gargle with salt water - Some people find that this helps with throat pain.   Use a cool mist humidifier - This adds moisture to the air to keep the throat from getting too dry and might help with pain.   Avoid smoking or being around people who are smoking - Smoke can make throat pain worse.  When can I go back to work or school? -- If you have strep throat, wait 1 day after starting antibiotics. By then, you will be a lot less likely to spread the infection to others.  If you have COVID-19, stay home from work or school and \"self-isolate\" until your doctor or nurse tells you it's safe to stop. Self-isolation means staying apart from other people, even the people you live with. When you can stop self-isolation depends on how long it has been since you had symptoms, and in some cases, whether you have had a negative test (showing that the virus is no longer in your body).  If you have a sore throat that is not due to strep throat or COVID-19, you can go back to your usual activities as soon as you feel well. But it's still important to wash your hands often and cover your mouth if you cough.  When should I call the doctor? -- Call your doctor or nurse if:   You have a fever of at least 101°F (38.4°C).   Your throat pain is severe within the first 2 days, or does not start to improve within 5 to 7 days.   You " are having trouble getting enough to eat or drink.   You got antibiotics but still have symptoms after finishing them.  Call for an ambulance (in the [place], call 9-1-1) or go to the emergency department if you:   Have trouble breathing   Are drooling because you cannot swallow your saliva   Have swelling of the neck or tongue   Cannot move your neck, or have trouble opening your mouth  What can I do to prevent getting a sore throat again? -- Wash your hands often with soap and water (figure 1). It is one of the best ways to prevent the spread of infection.  All topics are updated as new evidence becomes available and our peer review process is complete.  This topic retrieved from [employer] on: Mar 13, 2024.  Topic 99172 Version 23.0  Release: 32.2.4 - C32.71  © 2024 UpToDate, Inc. and/or its affiliates. All rights reserved.  figure 1: How to wash your hands     Wet your hands with clean water, and apply a small amount of soap. Lather and rub hands together for at least 20 seconds. Clean your wrists, palms, backs of your hands, between your fingers, tips of your fingers, thumbs, and under and around your nails. Rinse well, and dry your hands using a clean towel.  Graphic 828094 Version 7.0  Consumer Information Use and Disclaimer   Disclaimer: This generalized information is a limited summary of diagnosis, treatment, and/or medication information. It is not meant to be comprehensive and should be used as a tool to help the user understand and/or assess potential diagnostic and treatment options. It does NOT include all information about conditions, treatments, medications, side effects, or risks that may apply to a specific patient. It is not intended to be medical advice or a substitute for the medical advice, diagnosis, or treatment of a health care provider based on the health care provider's examination and assessment of a patient's specific and unique circumstances. Patients must speak with a health care  provider for complete information about their health, medical questions, and treatment options, including any risks or benefits regarding use of medications. This information does not endorse any treatments or medications as safe, effective, or approved for treating a specific patient. UpToDate, Inc. and its affiliates disclaim any warranty or liability relating to this information or the use thereof.The use of this information is governed by the Terms of Use, available at https://www.[employer].com/en/know/clinical-effectiveness-terms. 2024© UpToDate, Inc. and its affiliates and/or licensors. All rights reserved.  Copyright   © 2024 UpToDate, Inc. and/or its affiliates. All rights reserved.

## 2024-11-21 LAB — BACTERIA THROAT CULT: NORMAL

## 2025-06-26 ENCOUNTER — OFFICE VISIT (OUTPATIENT)
Dept: INTERNAL MEDICINE CLINIC | Facility: CLINIC | Age: 35
End: 2025-06-26
Payer: COMMERCIAL

## 2025-06-26 VITALS
SYSTOLIC BLOOD PRESSURE: 118 MMHG | DIASTOLIC BLOOD PRESSURE: 82 MMHG | BODY MASS INDEX: 32.58 KG/M2 | RESPIRATION RATE: 17 BRPM | OXYGEN SATURATION: 96 % | WEIGHT: 215 LBS | HEART RATE: 70 BPM | HEIGHT: 68 IN

## 2025-06-26 DIAGNOSIS — F41.9 ANXIETY: Primary | ICD-10-CM

## 2025-06-26 PROCEDURE — 99213 OFFICE O/P EST LOW 20 MIN: CPT

## 2025-06-26 RX ORDER — BUSPIRONE HYDROCHLORIDE 10 MG/1
10 TABLET ORAL 2 TIMES DAILY
Qty: 180 TABLET | Refills: 1 | Status: SHIPPED | OUTPATIENT
Start: 2025-06-26

## 2025-06-26 RX ORDER — HYDROXYZINE HYDROCHLORIDE 25 MG/1
25 TABLET, FILM COATED ORAL EVERY 6 HOURS PRN
Qty: 30 TABLET | Refills: 1 | Status: SHIPPED | OUTPATIENT
Start: 2025-06-26

## 2025-06-26 NOTE — ASSESSMENT & PLAN NOTE
Discussed medication options. Depression is not a factor. Patient is agreeable to try buspar 10mg twice daily to help decrease anxiety. Discussed possible drowsiness as a side effect, will be careful when drinking. Discussed use of hydroxyzine as needed and side effects. Patient will use mostly in the evenings to help with sleep or during episodes of uncontrolled anxiety.   Orders:  •  busPIRone (BUSPAR) 10 mg tablet; Take 1 tablet (10 mg total) by mouth 2 (two) times a day  •  hydrOXYzine HCL (ATARAX) 25 mg tablet; Take 1 tablet (25 mg total) by mouth every 6 (six) hours as needed for itching

## 2025-06-26 NOTE — PROGRESS NOTES
Name: Tom Sanchez      : 1990      MRN: [id number]  Encounter Provider: LATRICE Malhotra  Encounter Date: 2025   Encounter department: [facility] [place]  :  Assessment & Plan  Anxiety  Discussed medication options. Depression is not a factor. Patient is agreeable to try buspar 10mg twice daily to help decrease anxiety. Discussed possible drowsiness as a side effect, will be careful when drinking. Discussed use of hydroxyzine as needed and side effects. Patient will use mostly in the evenings to help with sleep or during episodes of uncontrolled anxiety.   Orders:  •  busPIRone (BUSPAR) 10 mg tablet; Take 1 tablet (10 mg total) by mouth 2 (two) times a day  •  hydrOXYzine HCL (ATARAX) 25 mg tablet; Take 1 tablet (25 mg total) by mouth every 6 (six) hours as needed for itching           History of Present Illness {?Quick Links Encounters * My Last Note * Last Note in Specialty * Snapshot * Since Last Visit * History :[id number]}  Tom is here today with concerns for anxiety. He feels his anxiety comes in episodes regarding poor weather conditions and political news. This causes him to experience panic attacks at times and uncontrolled worrying. He does not smoke or use recreational drugs. He does drink nightly 1-3 beers to help him relax after coming home. He has a good support system.     Anxiety  Symptoms include nervous/anxious behavior. Patient reports no chest pain, dizziness, nausea, palpitations or shortness of breath.         Review of Systems   Constitutional:  Negative for chills and fever.   HENT:  Negative for congestion, ear pain, rhinorrhea, sinus pressure and sore throat.    Eyes:  Negative for pain and visual disturbance.   Respiratory:  Negative for cough, chest tightness and shortness of breath.    Cardiovascular:  Negative for chest pain, palpitations and leg swelling.   Gastrointestinal:  Negative for abdominal pain, constipation, diarrhea, nausea and  "vomiting.   Endocrine: Negative.    Genitourinary:  Negative for dysuria, frequency and hematuria.   Musculoskeletal:  Negative for arthralgias, back pain and myalgias.   Skin:  Negative for color change and rash.   Allergic/Immunologic: Negative.    Neurological:  Negative for dizziness, seizures, syncope, light-headedness and headaches.   Hematological: Negative.    Psychiatric/Behavioral:  Negative for dysphoric mood. The patient is nervous/anxious.    All other systems reviewed and are negative.      Objective {?Quick Links Trend Vitals * Enter New Vitals * Results Review * Timeline (Adult) * Labs * Imaging * Cardiology * Procedures * Lung Cancer Screening * Surgical eConsent :[id number]}  /82 (BP Location: Left arm, Patient Position: Sitting, Cuff Size: Adult)   Pulse 70   Resp 17   Ht 5' 8\" (1.727 m)   Wt 97.5 kg (215 lb)   SpO2 96%   BMI 32.69 kg/m²      Physical Exam  Vitals and nursing note reviewed.   Constitutional:       General: He is not in acute distress.     Appearance: He is well-developed.     Cardiovascular:      Rate and Rhythm: Normal rate and regular rhythm.      Pulses: Normal pulses.      Heart sounds: Normal heart sounds. No murmur heard.  Pulmonary:      Effort: Pulmonary effort is normal. No respiratory distress.      Breath sounds: Normal breath sounds.   Abdominal:      General: Bowel sounds are normal.     Skin:     General: Skin is warm and dry.     Neurological:      Mental Status: He is alert and oriented to person, place, and time.     Psychiatric:         Mood and Affect: Mood normal.         "

## 2025-07-31 ENCOUNTER — OFFICE VISIT (OUTPATIENT)
Dept: INTERNAL MEDICINE CLINIC | Facility: CLINIC | Age: 35
End: 2025-07-31
Payer: COMMERCIAL

## 2025-07-31 VITALS
SYSTOLIC BLOOD PRESSURE: 124 MMHG | OXYGEN SATURATION: 96 % | DIASTOLIC BLOOD PRESSURE: 78 MMHG | HEART RATE: 63 BPM | BODY MASS INDEX: 33.34 KG/M2 | WEIGHT: 220 LBS | HEIGHT: 68 IN

## 2025-07-31 DIAGNOSIS — Z00.00 ANNUAL PHYSICAL EXAM: Primary | ICD-10-CM

## 2025-07-31 DIAGNOSIS — Z13.1 SCREENING FOR DIABETES MELLITUS: ICD-10-CM

## 2025-07-31 DIAGNOSIS — Z13.220 SCREENING FOR LIPID DISORDERS: ICD-10-CM

## 2025-07-31 DIAGNOSIS — F41.9 ANXIETY: ICD-10-CM

## 2025-07-31 PROCEDURE — 99395 PREV VISIT EST AGE 18-39: CPT
